# Patient Record
Sex: MALE | Race: WHITE | Employment: OTHER | URBAN - NONMETROPOLITAN AREA
[De-identification: names, ages, dates, MRNs, and addresses within clinical notes are randomized per-mention and may not be internally consistent; named-entity substitution may affect disease eponyms.]

---

## 2017-01-01 ENCOUNTER — ANESTHESIA (OUTPATIENT)
Dept: OPERATING ROOM | Age: 72
End: 2017-01-01
Payer: MEDICARE

## 2017-01-01 ENCOUNTER — OFFICE VISIT (OUTPATIENT)
Dept: VASCULAR SURGERY | Age: 72
End: 2017-01-01

## 2017-01-01 ENCOUNTER — APPOINTMENT (OUTPATIENT)
Dept: GENERAL RADIOLOGY | Age: 72
DRG: 189 | End: 2017-01-01
Payer: MEDICARE

## 2017-01-01 ENCOUNTER — HOSPITAL ENCOUNTER (EMERGENCY)
Age: 72
Discharge: HOSPICE | DRG: 189 | End: 2017-04-07
Attending: INTERNAL MEDICINE | Admitting: INTERNAL MEDICINE
Payer: MEDICARE

## 2017-01-01 ENCOUNTER — APPOINTMENT (OUTPATIENT)
Dept: CT IMAGING | Age: 72
DRG: 189 | End: 2017-01-01
Payer: MEDICARE

## 2017-01-01 ENCOUNTER — ANESTHESIA EVENT (OUTPATIENT)
Dept: OPERATING ROOM | Age: 72
End: 2017-01-01
Payer: MEDICARE

## 2017-01-01 ENCOUNTER — SURGERY (OUTPATIENT)
Age: 72
End: 2017-01-01

## 2017-01-01 ENCOUNTER — PREP FOR PROCEDURE (OUTPATIENT)
Dept: VASCULAR SURGERY | Age: 72
End: 2017-01-01

## 2017-01-01 VITALS
TEMPERATURE: 96.4 F | RESPIRATION RATE: 16 BRPM | WEIGHT: 120 LBS | BODY MASS INDEX: 18.83 KG/M2 | HEIGHT: 67 IN | HEART RATE: 51 BPM | DIASTOLIC BLOOD PRESSURE: 46 MMHG | OXYGEN SATURATION: 99 % | SYSTOLIC BLOOD PRESSURE: 101 MMHG

## 2017-01-01 VITALS
OXYGEN SATURATION: 100 % | SYSTOLIC BLOOD PRESSURE: 121 MMHG | TEMPERATURE: 97 F | RESPIRATION RATE: 8 BRPM | DIASTOLIC BLOOD PRESSURE: 45 MMHG

## 2017-01-01 VITALS — TEMPERATURE: 99.2 F | DIASTOLIC BLOOD PRESSURE: 83 MMHG | SYSTOLIC BLOOD PRESSURE: 130 MMHG | HEART RATE: 65 BPM

## 2017-01-01 VITALS — TEMPERATURE: 97.8 F | HEART RATE: 68 BPM

## 2017-01-01 VITALS — HEART RATE: 66 BPM | TEMPERATURE: 98.6 F

## 2017-01-01 DIAGNOSIS — Z09 FOLLOW UP: Primary | ICD-10-CM

## 2017-01-01 DIAGNOSIS — J96.02 ACUTE RESPIRATORY FAILURE WITH HYPERCAPNIA (HCC): Primary | ICD-10-CM

## 2017-01-01 DIAGNOSIS — N18.5 CKD (CHRONIC KIDNEY DISEASE), STAGE V (HCC): ICD-10-CM

## 2017-01-01 DIAGNOSIS — N18.6 ESRD (END STAGE RENAL DISEASE) (HCC): ICD-10-CM

## 2017-01-01 LAB
ALBUMIN SERPL-MCNC: 3.5 G/DL (ref 3.5–5.2)
ALP BLD-CCNC: 77 U/L (ref 40–130)
ALT SERPL-CCNC: 80 U/L (ref 5–41)
ANION GAP SERPL CALCULATED.3IONS-SCNC: 11 MMOL/L (ref 7–19)
APTT: >200 SEC (ref 26–36.2)
AST SERPL-CCNC: 65 U/L (ref 5–40)
BASE EXCESS ARTERIAL: 3.6 MMOL/L (ref -2–2)
BASOPHILS ABSOLUTE: 0 K/UL (ref 0–0.2)
BASOPHILS RELATIVE PERCENT: 0.1 % (ref 0–1)
BILIRUB SERPL-MCNC: 0.3 MG/DL (ref 0.2–1.2)
BUN BLDV-MCNC: 29 MG/DL (ref 8–23)
CALCIUM SERPL-MCNC: 9.1 MG/DL (ref 8.8–10.2)
CARBOXYHEMOGLOBIN ARTERIAL: 0.9 % (ref 0–5)
CHLORIDE BLD-SCNC: 91 MMOL/L (ref 98–111)
CO2: 31 MMOL/L (ref 22–29)
CREAT SERPL-MCNC: 3.4 MG/DL (ref 0.5–1.2)
EOSINOPHILS ABSOLUTE: 0 K/UL (ref 0–0.6)
EOSINOPHILS RELATIVE PERCENT: 0.5 % (ref 0–5)
GFR NON-AFRICAN AMERICAN: 18
GLOBULIN: 2.8 G/DL
GLUCOSE BLD-MCNC: 92 MG/DL (ref 74–109)
HCO3 ARTERIAL: 34.2 MMOL/L (ref 22–26)
HCT VFR BLD CALC: 30.9 % (ref 42–52)
HEMOGLOBIN, ART, EXTENDED: 9.7 G/DL (ref 14–18)
HEMOGLOBIN: 9.7 G/DL (ref 14–18)
INR BLD: 1.08 (ref 0.88–1.18)
LACTIC ACID: 0.6 MG/DL (ref 0.5–1.9)
LYMPHOCYTES ABSOLUTE: 0.6 K/UL (ref 1.1–4.5)
LYMPHOCYTES RELATIVE PERCENT: 6.9 % (ref 20–40)
MCH RBC QN AUTO: 31.2 PG (ref 27–31)
MCHC RBC AUTO-ENTMCNC: 31.4 G/DL (ref 33–37)
MCV RBC AUTO: 99.4 FL (ref 80–94)
METHEMOGLOBIN ARTERIAL: 0.5 %
MONOCYTES ABSOLUTE: 0.6 K/UL (ref 0–0.9)
MONOCYTES RELATIVE PERCENT: 6.8 % (ref 0–10)
NEUTROPHILS ABSOLUTE: 7 K/UL (ref 1.5–7.5)
NEUTROPHILS RELATIVE PERCENT: 84.8 % (ref 50–65)
O2 CONTENT ARTERIAL: 13.4 ML/DL
O2 SAT, ARTERIAL: 96.7 %
O2 THERAPY: ABNORMAL
PCO2 ARTERIAL: 96 MMHG (ref 35–45)
PDW BLD-RTO: 13.6 % (ref 11.5–14.5)
PH ARTERIAL: 7.16 (ref 7.35–7.45)
PLATELET # BLD: 122 K/UL (ref 130–400)
PMV BLD AUTO: 9.9 FL (ref 7.4–10.4)
PO2 ARTERIAL: 129 MMHG (ref 80–100)
POTASSIUM SERPL-SCNC: 4.3 MMOL/L (ref 3.5–5)
POTASSIUM, WHOLE BLOOD: 4.2
PRO-BNP: ABNORMAL PG/ML (ref 0–900)
PROTHROMBIN TIME: 14 SEC (ref 12–14.6)
RAPID INFLUENZA  B AGN: NEGATIVE
RAPID INFLUENZA A AGN: POSITIVE
RBC # BLD: 3.11 M/UL (ref 4.7–6.1)
SODIUM BLD-SCNC: 133 MMOL/L (ref 136–145)
TOTAL PROTEIN: 6.3 G/DL (ref 6.6–8.7)
WBC # BLD: 8.2 K/UL (ref 4.8–10.8)

## 2017-01-01 PROCEDURE — 85730 THROMBOPLASTIN TIME PARTIAL: CPT

## 2017-01-01 PROCEDURE — 87804 INFLUENZA ASSAY W/OPTIC: CPT

## 2017-01-01 PROCEDURE — 99024 POSTOP FOLLOW-UP VISIT: CPT | Performed by: PHYSICIAN ASSISTANT

## 2017-01-01 PROCEDURE — 2500000003 HC RX 250 WO HCPCS: Performed by: NURSE ANESTHETIST, CERTIFIED REGISTERED

## 2017-01-01 PROCEDURE — 94640 AIRWAY INHALATION TREATMENT: CPT

## 2017-01-01 PROCEDURE — 83605 ASSAY OF LACTIC ACID: CPT

## 2017-01-01 PROCEDURE — 36415 COLL VENOUS BLD VENIPUNCTURE: CPT

## 2017-01-01 PROCEDURE — 6360000002 HC RX W HCPCS: Performed by: PHYSICIAN ASSISTANT

## 2017-01-01 PROCEDURE — 36600 WITHDRAWAL OF ARTERIAL BLOOD: CPT

## 2017-01-01 PROCEDURE — 6360000002 HC RX W HCPCS: Performed by: NURSE ANESTHETIST, CERTIFIED REGISTERED

## 2017-01-01 PROCEDURE — 85610 PROTHROMBIN TIME: CPT

## 2017-01-01 PROCEDURE — 82803 BLOOD GASES ANY COMBINATION: CPT

## 2017-01-01 PROCEDURE — 2580000003 HC RX 258: Performed by: PHYSICIAN ASSISTANT

## 2017-01-01 PROCEDURE — 80053 COMPREHEN METABOLIC PANEL: CPT

## 2017-01-01 PROCEDURE — 71250 CT THORAX DX C-: CPT

## 2017-01-01 PROCEDURE — 71010 XR CHEST PORTABLE: CPT

## 2017-01-01 PROCEDURE — 99285 EMERGENCY DEPT VISIT HI MDM: CPT

## 2017-01-01 PROCEDURE — 85025 COMPLETE CBC W/AUTO DIFF WBC: CPT

## 2017-01-01 PROCEDURE — 99284 EMERGENCY DEPT VISIT MOD MDM: CPT | Performed by: PHYSICIAN ASSISTANT

## 2017-01-01 PROCEDURE — 99024 POSTOP FOLLOW-UP VISIT: CPT | Performed by: NURSE PRACTITIONER

## 2017-01-01 PROCEDURE — 87040 BLOOD CULTURE FOR BACTERIA: CPT

## 2017-01-01 PROCEDURE — 83880 ASSAY OF NATRIURETIC PEPTIDE: CPT

## 2017-01-01 PROCEDURE — 93005 ELECTROCARDIOGRAM TRACING: CPT

## 2017-01-01 PROCEDURE — 84132 ASSAY OF SERUM POTASSIUM: CPT

## 2017-01-01 PROCEDURE — 6370000000 HC RX 637 (ALT 250 FOR IP): Performed by: PHYSICIAN ASSISTANT

## 2017-01-01 PROCEDURE — 6360000002 HC RX W HCPCS: Performed by: ANESTHESIOLOGY

## 2017-01-01 RX ORDER — PROPOFOL 10 MG/ML
INJECTION, EMULSION INTRAVENOUS PRN
Status: DISCONTINUED | OUTPATIENT
Start: 2017-01-01 | End: 2017-01-01 | Stop reason: SDUPTHER

## 2017-01-01 RX ORDER — LIDOCAINE HYDROCHLORIDE 10 MG/ML
INJECTION, SOLUTION EPIDURAL; INFILTRATION; INTRACAUDAL; PERINEURAL PRN
Status: DISCONTINUED | OUTPATIENT
Start: 2017-01-01 | End: 2017-01-01 | Stop reason: SDUPTHER

## 2017-01-01 RX ORDER — MORPHINE SULFATE 4 MG/ML
4 INJECTION, SOLUTION INTRAMUSCULAR; INTRAVENOUS ONCE
Status: COMPLETED | OUTPATIENT
Start: 2017-01-01 | End: 2017-01-01

## 2017-01-01 RX ORDER — SODIUM CHLORIDE 0.9 % (FLUSH) 0.9 %
10 SYRINGE (ML) INJECTION PRN
Status: CANCELLED | OUTPATIENT
Start: 2017-01-01

## 2017-01-01 RX ORDER — SODIUM CHLORIDE 0.9 % (FLUSH) 0.9 %
10 SYRINGE (ML) INJECTION EVERY 12 HOURS SCHEDULED
Status: CANCELLED | OUTPATIENT
Start: 2017-01-01

## 2017-01-01 RX ORDER — EPHEDRINE SULFATE 50 MG/ML
INJECTION, SOLUTION INTRAVENOUS PRN
Status: DISCONTINUED | OUTPATIENT
Start: 2017-01-01 | End: 2017-01-01 | Stop reason: SDUPTHER

## 2017-01-01 RX ORDER — OXYMETAZOLINE HYDROCHLORIDE 0.05 G/100ML
2 SPRAY NASAL 2 TIMES DAILY
COMMUNITY

## 2017-01-01 RX ORDER — SUCCINYLCHOLINE CHLORIDE 20 MG/ML
INJECTION INTRAMUSCULAR; INTRAVENOUS PRN
Status: DISCONTINUED | OUTPATIENT
Start: 2017-01-01 | End: 2017-01-01 | Stop reason: SDUPTHER

## 2017-01-01 RX ORDER — ROCURONIUM BROMIDE 10 MG/ML
INJECTION, SOLUTION INTRAVENOUS PRN
Status: DISCONTINUED | OUTPATIENT
Start: 2017-01-01 | End: 2017-01-01 | Stop reason: SDUPTHER

## 2017-01-01 RX ORDER — VANCOMYCIN HYDROCHLORIDE 1 G/200ML
1000 INJECTION, SOLUTION INTRAVENOUS ONCE
Status: DISCONTINUED | OUTPATIENT
Start: 2017-01-01 | End: 2017-01-01

## 2017-01-01 RX ORDER — VANCOMYCIN HYDROCHLORIDE 1 G/200ML
1000 INJECTION, SOLUTION INTRAVENOUS
Status: CANCELLED | OUTPATIENT
Start: 2017-01-01 | End: 2017-01-01

## 2017-01-01 RX ORDER — 0.9 % SODIUM CHLORIDE 0.9 %
1000 INTRAVENOUS SOLUTION INTRAVENOUS ONCE
Status: COMPLETED | OUTPATIENT
Start: 2017-01-01 | End: 2017-01-01

## 2017-01-01 RX ORDER — MORPHINE SULFATE 4 MG/ML
4 INJECTION, SOLUTION INTRAMUSCULAR; INTRAVENOUS ONCE
Status: DISCONTINUED | OUTPATIENT
Start: 2017-01-01 | End: 2017-01-01

## 2017-01-01 RX ORDER — IPRATROPIUM BROMIDE AND ALBUTEROL SULFATE 2.5; .5 MG/3ML; MG/3ML
1 SOLUTION RESPIRATORY (INHALATION) ONCE
Status: COMPLETED | OUTPATIENT
Start: 2017-01-01 | End: 2017-01-01

## 2017-01-01 RX ORDER — ASPIRIN 81 MG/1
81 TABLET ORAL ONCE
Status: CANCELLED | OUTPATIENT
Start: 2017-01-01 | End: 2017-01-01

## 2017-01-01 RX ORDER — HYDROCODONE BITARTRATE AND ACETAMINOPHEN 7.5; 325 MG/1; MG/1
1 TABLET ORAL EVERY 6 HOURS PRN
COMMUNITY

## 2017-01-01 RX ORDER — LORAZEPAM 2 MG/ML
1 INJECTION INTRAMUSCULAR ONCE
Status: COMPLETED | OUTPATIENT
Start: 2017-01-01 | End: 2017-01-01

## 2017-01-01 RX ADMIN — MORPHINE SULFATE 4 MG: 4 INJECTION, SOLUTION INTRAMUSCULAR; INTRAVENOUS at 18:39

## 2017-01-01 RX ADMIN — ROCURONIUM BROMIDE 10 MG: 10 INJECTION INTRAVENOUS at 13:44

## 2017-01-01 RX ADMIN — BUPIVACAINE HYDROCHLORIDE AND EPINEPHRINE 50 ML: 2.5; 5 INJECTION, SOLUTION EPIDURAL; INFILTRATION; INTRACAUDAL; PERINEURAL at 14:00

## 2017-01-01 RX ADMIN — FENTANYL CITRATE 50 MCG: 50 INJECTION INTRAMUSCULAR; INTRAVENOUS at 13:44

## 2017-01-01 RX ADMIN — SUCCINYLCHOLINE CHLORIDE 100 MG: 20 INJECTION, SOLUTION INTRAMUSCULAR; INTRAVENOUS; PARENTERAL at 13:44

## 2017-01-01 RX ADMIN — LIDOCAINE HYDROCHLORIDE 5 ML: 10 INJECTION, SOLUTION EPIDURAL; INFILTRATION; INTRACAUDAL; PERINEURAL at 13:44

## 2017-01-01 RX ADMIN — TAZOBACTAM SODIUM AND PIPERACILLIN SODIUM 3.38 G: 375; 3 INJECTION, SOLUTION INTRAVENOUS at 18:40

## 2017-01-01 RX ADMIN — EPHEDRINE SULFATE 10 MG: 50 INJECTION, SOLUTION INTRAMUSCULAR; INTRAVENOUS; SUBCUTANEOUS at 13:57

## 2017-01-01 RX ADMIN — SODIUM CHLORIDE 1000 ML: 9 INJECTION, SOLUTION INTRAVENOUS at 18:40

## 2017-01-01 RX ADMIN — EPHEDRINE SULFATE 10 MG: 50 INJECTION, SOLUTION INTRAMUSCULAR; INTRAVENOUS; SUBCUTANEOUS at 14:00

## 2017-01-01 RX ADMIN — HEPARIN SODIUM: 1000 INJECTION, SOLUTION INTRAVENOUS; SUBCUTANEOUS at 13:39

## 2017-01-01 RX ADMIN — MORPHINE SULFATE 4 MG: 4 INJECTION, SOLUTION INTRAMUSCULAR; INTRAVENOUS at 18:57

## 2017-01-01 RX ADMIN — IPRATROPIUM BROMIDE AND ALBUTEROL SULFATE 1 AMPULE: .5; 3 SOLUTION RESPIRATORY (INHALATION) at 18:09

## 2017-01-01 RX ADMIN — PROPOFOL 125 MG: 10 INJECTION, EMULSION INTRAVENOUS at 13:44

## 2017-01-01 RX ADMIN — LORAZEPAM 1 MG: 2 INJECTION, SOLUTION INTRAMUSCULAR; INTRAVENOUS at 18:57

## 2017-01-01 ASSESSMENT — ENCOUNTER SYMPTOMS
CHEST TIGHTNESS: 0
SORE THROAT: 0
VOMITING: 0
NAUSEA: 0
BACK PAIN: 0
CONSTIPATION: 0
COLOR CHANGE: 0
STRIDOR: 0
ABDOMINAL DISTENTION: 0
COUGH: 1
SHORTNESS OF BREATH: 1
ABDOMINAL PAIN: 0
WHEEZING: 0
RHINORRHEA: 0
SHORTNESS OF BREATH: 1

## 2017-01-01 ASSESSMENT — PAIN SCALES - GENERAL
PAINLEVEL_OUTOF10: 4
PAINLEVEL_OUTOF10: 5

## 2017-01-16 PROBLEM — N18.5 CKD (CHRONIC KIDNEY DISEASE), STAGE V (HCC): Status: ACTIVE | Noted: 2017-01-01

## 2017-02-09 ENCOUNTER — APPOINTMENT (OUTPATIENT)
Dept: GENERAL RADIOLOGY | Facility: HOSPITAL | Age: 72
End: 2017-02-09

## 2017-02-09 ENCOUNTER — HOSPITAL ENCOUNTER (EMERGENCY)
Facility: HOSPITAL | Age: 72
Discharge: HOME | End: 2017-02-09
Attending: EMERGENCY MEDICINE | Admitting: EMERGENCY MEDICINE

## 2017-02-09 ENCOUNTER — APPOINTMENT (OUTPATIENT)
Dept: CT IMAGING | Facility: HOSPITAL | Age: 72
End: 2017-02-09

## 2017-02-09 VITALS
WEIGHT: 121 LBS | BODY MASS INDEX: 18.99 KG/M2 | RESPIRATION RATE: 18 BRPM | HEART RATE: 72 BPM | DIASTOLIC BLOOD PRESSURE: 84 MMHG | HEIGHT: 67 IN | OXYGEN SATURATION: 95 % | SYSTOLIC BLOOD PRESSURE: 136 MMHG | TEMPERATURE: 98.3 F

## 2017-02-09 DIAGNOSIS — S70.01XA CONTUSION OF RIGHT HIP, INITIAL ENCOUNTER: Primary | ICD-10-CM

## 2017-02-09 PROCEDURE — 73552 X-RAY EXAM OF FEMUR 2/>: CPT

## 2017-02-09 PROCEDURE — 99283 EMERGENCY DEPT VISIT LOW MDM: CPT

## 2017-02-09 PROCEDURE — 73502 X-RAY EXAM HIP UNI 2-3 VIEWS: CPT

## 2017-02-09 PROCEDURE — 72192 CT PELVIS W/O DYE: CPT

## 2017-02-09 RX ORDER — INDOMETHACIN 50 MG/1
50 CAPSULE ORAL
Qty: 30 CAPSULE | Refills: 0 | Status: SHIPPED | OUTPATIENT
Start: 2017-02-09 | End: 2017-03-22

## 2017-02-09 RX ORDER — INSULIN GLARGINE 100 [IU]/ML
INJECTION, SOLUTION SUBCUTANEOUS
COMMUNITY
End: 2017-03-22

## 2017-02-09 RX ORDER — BUDESONIDE AND FORMOTEROL FUMARATE DIHYDRATE 160; 4.5 UG/1; UG/1
AEROSOL RESPIRATORY (INHALATION)
COMMUNITY
End: 2017-03-22

## 2017-02-10 NOTE — ED PROVIDER NOTES
Subjective   HPI Comments: Lost balance and fell yesterday and now hurts in right hip whenever he tries to put weight on it.    Patient is a 72 y.o. male presenting with lower extremity pain.   History provided by:  Patient and spouse   used: No    Lower Extremity Issue   Location:  Hip  Time since incident:  1 day  Injury: yes    Mechanism of injury: fall    Hip location:  R hip  Pain details:     Quality:  Aching    Radiates to:  Does not radiate    Severity:  Moderate    Onset quality:  Sudden    Duration:  1 day    Timing:  Constant    Progression:  Unchanged  Chronicity:  New  Dislocation: no    Foreign body present:  No foreign bodies  Prior injury to area:  No  Relieved by:  Nothing  Worsened by:  Nothing  Ineffective treatments:  None tried  Associated symptoms: decreased ROM    Associated symptoms: no back pain and no fatigue    Risk factors: no concern for non-accidental trauma, no frequent fractures and no known bone disorder        Review of Systems   Constitutional: Negative.  Negative for fatigue.   HENT: Negative.    Respiratory: Negative.    Cardiovascular: Negative.    Genitourinary: Negative.    Musculoskeletal: Negative for back pain.   Neurological: Negative.    All other systems reviewed and are negative.      Past Medical History   Diagnosis Date   • CAD (coronary artery disease)    • Carotid atherosclerosis    • Diabetes mellitus    • Hyperlipidemia    • Hypertension    • Pacemaker      does not have  a pacemaker        Allergies   Allergen Reactions   • Contrast Dye      PT HAS KIDNEY DIEASE   • Promethazine Hcl Other (See Comments)     Kidney disease   • Hydrocodone-Acetaminophen Nausea Only     Makes him sick in higher doses       Past Surgical History   Procedure Laterality Date   • Cardiac catheterization     • Carotid stent       RCA   • Av fistula placement         Family History   Problem Relation Age of Onset   • Heart attack Father        Social History     Social  History   • Marital status:      Spouse name: N/A   • Number of children: N/A   • Years of education: N/A     Social History Main Topics   • Smoking status: Former Smoker   • Smokeless tobacco: None      Comment: quit 30 years ago    • Alcohol use 1.2 oz/week     2 Standard drinks or equivalent per week   • Drug use: No   • Sexual activity: Defer     Other Topics Concern   • None     Social History Narrative   • None       Prior to Admission medications    Medication Sig Start Date End Date Taking? Authorizing Provider   albuterol (PROVENTIL) (2.5 MG/3ML) 0.083% nebulizer solution Take 2.5 mg by nebulization.    Historical Provider, MD   allopurinol (ZYLOPRIM) 100 MG tablet Take 100 mg by mouth 2 (Two) Times a Day.    Historical Provider, MD   amLODIPine (NORVASC) 5 MG tablet Take 5 mg by mouth 2 (Two) Times a Day.    Historical Provider, MD   atorvastatin (LIPITOR) 80 MG tablet Take 80 mg by mouth Daily.    Historical Provider, MD   B Complex-C-Folic Acid (JABIER-ARIS PO) Take  by mouth.    Historical Provider, MD   budesonide-formoterol (SYMBICORT) 160-4.5 MCG/ACT inhaler Inhale 2 puffs into the lungs 2 times daily Indications: Prevention of COPD Worsening    Historical Provider, MD   clopidogrel (PLAVIX) 75 MG tablet Take 75 mg by mouth Daily.    Historical Provider, MD   HYDROcodone-acetaminophen (NORCO) 7.5-325 MG per tablet Take 1 tablet by mouth Every 8 (Eight) Hours As Needed for moderate pain (4-6).    Historical Provider, MD   indomethacin (INDOCIN) 50 MG capsule Take 1 capsule by mouth 3 (Three) Times a Day With Meals. 2/9/17   Zackary Dahl Jr., MD   insulin glargine (LANTUS) 100 UNIT/ML injection Inject 12 Units into the skin daily Indications: takes 12U in am; takes later on dialysis days once a day at hs    Historical Provider, MD   metoprolol tartrate (LOPRESSOR) 100 MG tablet Take 100 mg by mouth Daily.    Historical Provider, MD   nitroglycerin (NITROSTAT) 0.4 MG SL tablet Place 0.4 mg  under the tongue Every 5 (Five) Minutes As Needed for chest pain. Take no more than 3 doses in 15 minutes.    Historical Provider, MD   omeprazole (PriLOSEC) 20 MG capsule Take 20 mg by mouth Daily.    Historical Provider, MD   oxymetazoline (AFRIN) 0.05 % nasal spray into each nostril.    Historical Provider, MD       Medications - No data to display    Vitals:    02/09/17 1809   BP: 136/84   Pulse: 72   Resp: 18   Temp: 98.3 °F (36.8 °C)   SpO2: 95%         Objective   Physical Exam   Constitutional: He is oriented to person, place, and time. He appears well-developed and well-nourished.   HENT:   Head: Normocephalic.   Musculoskeletal:   Tender to palpation in right hip but no deformity noted.   Neurological: He is oriented to person, place, and time.   Nursing note and vitals reviewed.      Procedures         Lab Results (last 24 hours)     ** No results found for the last 24 hours. **          CT Pelvis Without Contrast   Final Result   1. No CT evidence of acute bony abnormality right hip.       These findings were described on a digital voice clip recorded on the   PACS system at the time of dictation.       This report was finalized on 02/09/2017 19:47 by Dr. George Perea MD.      XR Femur 2 View Right   Final Result   . No evidence of acute fracture.   This report was finalized on 02/09/2017 16:43 by Dr. Evangelist Nobles MD.      XR Hip With or Without Pelvis 2 - 3 View Right   Final Result   . No evidence of fracture.   This report was finalized on 02/09/2017 16:27 by Dr. Evangelist Nobles MD.          ED Course  ED Course          MDM  Number of Diagnoses or Management Options  Contusion of right hip, initial encounter: new and requires workup     Amount and/or Complexity of Data Reviewed  Tests in the radiology section of CPT®: ordered and reviewed    Risk of Complications, Morbidity, and/or Mortality  Presenting problems: moderate  Diagnostic procedures: moderate  Management options:  moderate    Patient Progress  Patient progress: stable      Final diagnoses:   Contusion of right hip, initial encounter        Zackary Dahl Jr., MD  02/09/17 9295

## 2017-03-22 ENCOUNTER — HOSPITAL ENCOUNTER (INPATIENT)
Facility: HOSPITAL | Age: 72
LOS: 6 days | Discharge: NURSING, REHAB OR LONG-TERM CARE | End: 2017-03-28
Attending: EMERGENCY MEDICINE | Admitting: INTERNAL MEDICINE

## 2017-03-22 ENCOUNTER — APPOINTMENT (OUTPATIENT)
Dept: GENERAL RADIOLOGY | Facility: HOSPITAL | Age: 72
End: 2017-03-22

## 2017-03-22 DIAGNOSIS — J44.1 CHRONIC OBSTRUCTIVE PULMONARY DISEASE WITH ACUTE EXACERBATION (HCC): Primary | ICD-10-CM

## 2017-03-22 DIAGNOSIS — Z74.09 IMPAIRED MOBILITY: ICD-10-CM

## 2017-03-22 LAB
ALBUMIN SERPL-MCNC: 3.9 G/DL (ref 3.5–5)
ALBUMIN/GLOB SERPL: 1.1 G/DL (ref 1.1–2.5)
ALP SERPL-CCNC: 101 U/L (ref 24–120)
ALT SERPL W P-5'-P-CCNC: 48 U/L (ref 0–54)
ANION GAP SERPL CALCULATED.3IONS-SCNC: 10 MMOL/L (ref 4–13)
ARTERIAL PATENCY WRIST A: ABNORMAL
AST SERPL-CCNC: 51 U/L (ref 7–45)
ATMOSPHERIC PRESS: ABNORMAL MMHG
BASE EXCESS BLDA CALC-SCNC: 5.4 MMOL/L (ref -2–2)
BASOPHILS # BLD AUTO: 0.01 10*3/MM3 (ref 0–0.2)
BASOPHILS NFR BLD AUTO: 0.1 % (ref 0–2)
BDY SITE: ABNORMAL
BILIRUB SERPL-MCNC: 0.6 MG/DL (ref 0.1–1)
BUN BLD-MCNC: 18 MG/DL (ref 5–21)
BUN/CREAT SERPL: 9.7 (ref 7–25)
CALCIUM SPEC-SCNC: 9.5 MG/DL (ref 8.4–10.4)
CHLORIDE SERPL-SCNC: 94 MMOL/L (ref 98–110)
CO2 SERPL-SCNC: 37 MMOL/L (ref 24–31)
CREAT BLD-MCNC: 1.85 MG/DL (ref 0.5–1.4)
DEPRECATED RDW RBC AUTO: 50.1 FL (ref 40–54)
EOSINOPHIL # BLD AUTO: 0.3 10*3/MM3 (ref 0–0.7)
EOSINOPHIL NFR BLD AUTO: 3.2 % (ref 0–4)
ERYTHROCYTE [DISTWIDTH] IN BLOOD BY AUTOMATED COUNT: 14.1 % (ref 12–15)
GAS FLOW AIRWAY: 2 LPM
GFR SERPL CREATININE-BSD FRML MDRD: 36 ML/MIN/1.73
GLOBULIN UR ELPH-MCNC: 3.5 GM/DL
GLUCOSE BLD-MCNC: 137 MG/DL (ref 70–100)
HCO3 BLDA-SCNC: 33.7 MMOL/L (ref 22–26)
HCT VFR BLD AUTO: 35.7 % (ref 40–52)
HGB BLD-MCNC: 11.5 G/DL (ref 14–18)
IMM GRANULOCYTES # BLD: 0.04 10*3/MM3 (ref 0–0.03)
IMM GRANULOCYTES NFR BLD: 0.4 % (ref 0–5)
LYMPHOCYTES # BLD AUTO: 0.78 10*3/MM3 (ref 0.72–4.86)
LYMPHOCYTES NFR BLD AUTO: 8.3 % (ref 15–45)
MCH RBC QN AUTO: 31.3 PG (ref 28–32)
MCHC RBC AUTO-ENTMCNC: 32.2 G/DL (ref 33–36)
MCV RBC AUTO: 97 FL (ref 82–95)
MODALITY: ABNORMAL
MONOCYTES # BLD AUTO: 0.77 10*3/MM3 (ref 0.19–1.3)
MONOCYTES NFR BLD AUTO: 8.2 % (ref 4–12)
NEUTROPHILS # BLD AUTO: 7.45 10*3/MM3 (ref 1.87–8.4)
NEUTROPHILS NFR BLD AUTO: 79.8 % (ref 39–78)
PCO2 BLDA: 65.9 MM HG (ref 35–45)
PH BLDA: 7.33 PH UNITS (ref 7.35–7.45)
PLATELET # BLD AUTO: 206 10*3/MM3 (ref 130–400)
PMV BLD AUTO: 9.9 FL (ref 6–12)
PO2 BLDA: 90 MM HG (ref 80–100)
POTASSIUM BLD-SCNC: 3.8 MMOL/L (ref 3.5–5.3)
PROT SERPL-MCNC: 7.4 G/DL (ref 6.3–8.7)
RBC # BLD AUTO: 3.68 10*6/MM3 (ref 4.8–5.9)
SAO2 % BLDCOA: 96 % (ref 94–100)
SAO2 % BLDCOA: 96 % (ref 94–100)
SODIUM BLD-SCNC: 141 MMOL/L (ref 135–145)
WBC NRBC COR # BLD: 9.35 10*3/MM3 (ref 4.8–10.8)

## 2017-03-22 PROCEDURE — 25010000002 LEVOFLOXACIN PER 250 MG: Performed by: INTERNAL MEDICINE

## 2017-03-22 PROCEDURE — 93010 ELECTROCARDIOGRAM REPORT: CPT | Performed by: INTERNAL MEDICINE

## 2017-03-22 PROCEDURE — 82962 GLUCOSE BLOOD TEST: CPT

## 2017-03-22 PROCEDURE — 71010 HC CHEST PA OR AP: CPT

## 2017-03-22 PROCEDURE — 36600 WITHDRAWAL OF ARTERIAL BLOOD: CPT

## 2017-03-22 PROCEDURE — 25010000002 METHYLPREDNISOLONE PER 125 MG: Performed by: EMERGENCY MEDICINE

## 2017-03-22 PROCEDURE — 63710000001 INSULIN DETEMIR PER 5 UNITS: Performed by: INTERNAL MEDICINE

## 2017-03-22 PROCEDURE — 87040 BLOOD CULTURE FOR BACTERIA: CPT | Performed by: EMERGENCY MEDICINE

## 2017-03-22 PROCEDURE — 94799 UNLISTED PULMONARY SVC/PX: CPT

## 2017-03-22 PROCEDURE — 99284 EMERGENCY DEPT VISIT MOD MDM: CPT

## 2017-03-22 PROCEDURE — 85025 COMPLETE CBC W/AUTO DIFF WBC: CPT | Performed by: EMERGENCY MEDICINE

## 2017-03-22 PROCEDURE — 94660 CPAP INITIATION&MGMT: CPT

## 2017-03-22 PROCEDURE — 93005 ELECTROCARDIOGRAM TRACING: CPT | Performed by: EMERGENCY MEDICINE

## 2017-03-22 PROCEDURE — 25010000002 HEPARIN (PORCINE) PER 1000 UNITS: Performed by: INTERNAL MEDICINE

## 2017-03-22 PROCEDURE — 82803 BLOOD GASES ANY COMBINATION: CPT

## 2017-03-22 PROCEDURE — 80053 COMPREHEN METABOLIC PANEL: CPT | Performed by: EMERGENCY MEDICINE

## 2017-03-22 PROCEDURE — 63710000001 INSULIN LISPRO (HUMAN) PER 5 UNITS: Performed by: INTERNAL MEDICINE

## 2017-03-22 RX ORDER — ALLOPURINOL 100 MG/1
100 TABLET ORAL EVERY 12 HOURS SCHEDULED
Status: DISCONTINUED | OUTPATIENT
Start: 2017-03-22 | End: 2017-03-22

## 2017-03-22 RX ORDER — LEVOFLOXACIN 5 MG/ML
500 INJECTION, SOLUTION INTRAVENOUS
Status: DISCONTINUED | OUTPATIENT
Start: 2017-03-22 | End: 2017-03-23

## 2017-03-22 RX ORDER — AMLODIPINE BESYLATE 5 MG/1
5 TABLET ORAL EVERY 12 HOURS SCHEDULED
Status: DISCONTINUED | OUTPATIENT
Start: 2017-03-22 | End: 2017-03-28 | Stop reason: HOSPADM

## 2017-03-22 RX ORDER — ATORVASTATIN CALCIUM 40 MG/1
80 TABLET, FILM COATED ORAL NIGHTLY
Status: DISCONTINUED | OUTPATIENT
Start: 2017-03-22 | End: 2017-03-28 | Stop reason: HOSPADM

## 2017-03-22 RX ORDER — PANTOPRAZOLE SODIUM 40 MG/1
40 TABLET, DELAYED RELEASE ORAL
Status: DISCONTINUED | OUTPATIENT
Start: 2017-03-23 | End: 2017-03-28 | Stop reason: HOSPADM

## 2017-03-22 RX ORDER — ALBUTEROL SULFATE 90 UG/1
1 AEROSOL, METERED RESPIRATORY (INHALATION) EVERY 4 HOURS PRN
COMMUNITY
End: 2017-03-28 | Stop reason: HOSPADM

## 2017-03-22 RX ORDER — METOPROLOL TARTRATE 50 MG/1
50 TABLET, FILM COATED ORAL EVERY 12 HOURS SCHEDULED
Status: DISCONTINUED | OUTPATIENT
Start: 2017-03-22 | End: 2017-03-28 | Stop reason: HOSPADM

## 2017-03-22 RX ORDER — RAMIPRIL 10 MG/1
10 CAPSULE ORAL 2 TIMES DAILY
COMMUNITY
End: 2017-03-28 | Stop reason: HOSPADM

## 2017-03-22 RX ORDER — ONDANSETRON 2 MG/ML
4 INJECTION INTRAMUSCULAR; INTRAVENOUS EVERY 6 HOURS PRN
Status: DISCONTINUED | OUTPATIENT
Start: 2017-03-22 | End: 2017-03-28 | Stop reason: HOSPADM

## 2017-03-22 RX ORDER — SODIUM CHLORIDE 0.9 % (FLUSH) 0.9 %
10 SYRINGE (ML) INJECTION AS NEEDED
Status: DISCONTINUED | OUTPATIENT
Start: 2017-03-22 | End: 2017-03-28 | Stop reason: HOSPADM

## 2017-03-22 RX ORDER — CLONIDINE HYDROCHLORIDE 0.1 MG/1
0.1 TABLET ORAL EVERY 6 HOURS PRN
Status: DISCONTINUED | OUTPATIENT
Start: 2017-03-22 | End: 2017-03-28 | Stop reason: HOSPADM

## 2017-03-22 RX ORDER — BUMETANIDE 1 MG/1
1 TABLET ORAL DAILY
COMMUNITY
End: 2017-03-22

## 2017-03-22 RX ORDER — CLOPIDOGREL BISULFATE 75 MG/1
75 TABLET ORAL DAILY
Status: DISCONTINUED | OUTPATIENT
Start: 2017-03-23 | End: 2017-03-28 | Stop reason: HOSPADM

## 2017-03-22 RX ORDER — HEPARIN SODIUM 5000 [USP'U]/ML
5000 INJECTION, SOLUTION INTRAVENOUS; SUBCUTANEOUS EVERY 12 HOURS SCHEDULED
Status: DISCONTINUED | OUTPATIENT
Start: 2017-03-22 | End: 2017-03-28 | Stop reason: HOSPADM

## 2017-03-22 RX ORDER — IPRATROPIUM BROMIDE AND ALBUTEROL SULFATE 2.5; .5 MG/3ML; MG/3ML
3 SOLUTION RESPIRATORY (INHALATION) EVERY 4 HOURS PRN
Status: DISCONTINUED | OUTPATIENT
Start: 2017-03-22 | End: 2017-03-28 | Stop reason: HOSPADM

## 2017-03-22 RX ORDER — METHYLPREDNISOLONE SODIUM SUCCINATE 125 MG/2ML
60 INJECTION, POWDER, LYOPHILIZED, FOR SOLUTION INTRAMUSCULAR; INTRAVENOUS EVERY 12 HOURS
Status: DISCONTINUED | OUTPATIENT
Start: 2017-03-23 | End: 2017-03-24

## 2017-03-22 RX ORDER — DEXTROSE MONOHYDRATE 25 G/50ML
25 INJECTION, SOLUTION INTRAVENOUS
Status: DISCONTINUED | OUTPATIENT
Start: 2017-03-22 | End: 2017-03-28 | Stop reason: HOSPADM

## 2017-03-22 RX ORDER — TAMSULOSIN HYDROCHLORIDE 0.4 MG/1
1 CAPSULE ORAL NIGHTLY
COMMUNITY
End: 2017-03-22

## 2017-03-22 RX ORDER — SODIUM CHLORIDE 0.9 % (FLUSH) 0.9 %
1-10 SYRINGE (ML) INJECTION AS NEEDED
Status: DISCONTINUED | OUTPATIENT
Start: 2017-03-22 | End: 2017-03-28 | Stop reason: HOSPADM

## 2017-03-22 RX ORDER — ACETAMINOPHEN 325 MG/1
650 TABLET ORAL EVERY 6 HOURS PRN
Status: DISCONTINUED | OUTPATIENT
Start: 2017-03-22 | End: 2017-03-28 | Stop reason: HOSPADM

## 2017-03-22 RX ORDER — METHYLPREDNISOLONE SODIUM SUCCINATE 125 MG/2ML
80 INJECTION, POWDER, LYOPHILIZED, FOR SOLUTION INTRAMUSCULAR; INTRAVENOUS EVERY 8 HOURS
Status: DISCONTINUED | OUTPATIENT
Start: 2017-03-23 | End: 2017-03-22

## 2017-03-22 RX ORDER — NICOTINE POLACRILEX 4 MG
15 LOZENGE BUCCAL
Status: DISCONTINUED | OUTPATIENT
Start: 2017-03-22 | End: 2017-03-28 | Stop reason: HOSPADM

## 2017-03-22 RX ORDER — METOPROLOL TARTRATE 100 MG/1
100 TABLET ORAL DAILY
Status: DISCONTINUED | OUTPATIENT
Start: 2017-03-23 | End: 2017-03-22

## 2017-03-22 RX ORDER — RAMIPRIL 5 MG/1
10 CAPSULE ORAL EVERY 12 HOURS SCHEDULED
Status: DISCONTINUED | OUTPATIENT
Start: 2017-03-22 | End: 2017-03-24

## 2017-03-22 RX ORDER — NITROGLYCERIN 0.4 MG/1
0.4 TABLET SUBLINGUAL
Status: DISCONTINUED | OUTPATIENT
Start: 2017-03-22 | End: 2017-03-28 | Stop reason: HOSPADM

## 2017-03-22 RX ORDER — METHYLPREDNISOLONE SODIUM SUCCINATE 125 MG/2ML
125 INJECTION, POWDER, LYOPHILIZED, FOR SOLUTION INTRAMUSCULAR; INTRAVENOUS ONCE
Status: COMPLETED | OUTPATIENT
Start: 2017-03-22 | End: 2017-03-22

## 2017-03-22 RX ADMIN — LEVOFLOXACIN 500 MG: 500 INJECTION, SOLUTION INTRAVENOUS at 22:08

## 2017-03-22 RX ADMIN — AMLODIPINE BESYLATE 5 MG: 5 TABLET ORAL at 22:06

## 2017-03-22 RX ADMIN — DESMOPRESSIN ACETATE 80 MG: 0.2 TABLET ORAL at 23:38

## 2017-03-22 RX ADMIN — METHYLPREDNISOLONE SODIUM SUCCINATE 125 MG: 125 INJECTION, POWDER, FOR SOLUTION INTRAMUSCULAR; INTRAVENOUS at 18:24

## 2017-03-22 RX ADMIN — INSULIN LISPRO 2 UNITS: 100 INJECTION, SOLUTION INTRAVENOUS; SUBCUTANEOUS at 22:38

## 2017-03-22 RX ADMIN — INSULIN DETEMIR 10 UNITS: 100 INJECTION, SOLUTION SUBCUTANEOUS at 22:52

## 2017-03-22 RX ADMIN — HEPARIN SODIUM 5000 UNITS: 5000 INJECTION, SOLUTION INTRAVENOUS; SUBCUTANEOUS at 22:43

## 2017-03-22 RX ADMIN — METOPROLOL TARTRATE 50 MG: 50 TABLET ORAL at 22:41

## 2017-03-22 NOTE — ED PROVIDER NOTES
Subjective   HPI Comments: Patient presents complaining of shortness of breath that is gone worse over the past 4 days.  He has a long history of lung disease but is much worse now.  He says any exertion makes her extremely short of breath.  He has coughing but there is no production with it and no fever.  He also complains of diffuse weakness.  He has a very poor appetite also.  He had dialysis today and was told by the staff there to come to the ER to get checked out and at least get a chest x-ray.  He saw his primary care physician a couple days ago but they could not hear anything bad that time.    Patient is a 72 y.o. male presenting with shortness of breath.   History provided by:  Patient and relative   used: No    Shortness of Breath   Severity:  Moderate  Onset quality:  Gradual  Duration:  4 days  Timing:  Constant  Progression:  Worsening  Chronicity:  Recurrent  Context: not activity, not animal exposure, not emotional upset, not fumes, not known allergens, not occupational exposure, not pollens, not smoke exposure, not strong odors, not URI and not weather changes    Relieved by:  Nothing  Worsened by:  Exertion  Ineffective treatments:  None tried  Associated symptoms: cough    Associated symptoms: no abdominal pain, no chest pain, no claudication, no diaphoresis, no ear pain, no fever, no headaches, no hemoptysis, no neck pain, no PND, no rash, no sore throat, no sputum production, no syncope, no swollen glands, no vomiting and no wheezing    Risk factors: no recent alcohol use, no family hx of DVT, no hx of cancer, no hx of PE/DVT, no obesity, no oral contraceptive use, no prolonged immobilization, no recent surgery and no tobacco use        Review of Systems   Constitutional: Negative for diaphoresis and fever.   HENT: Negative.  Negative for ear pain and sore throat.    Respiratory: Positive for cough and shortness of breath. Negative for hemoptysis, sputum production and  wheezing.    Cardiovascular: Negative.  Negative for chest pain, claudication, syncope and PND.   Gastrointestinal: Negative.  Negative for abdominal pain and vomiting.   Genitourinary: Negative.    Musculoskeletal: Negative.  Negative for neck pain.   Skin: Negative.  Negative for rash.   Neurological: Positive for weakness. Negative for headaches.   Psychiatric/Behavioral: Negative.    All other systems reviewed and are negative.      Past Medical History:   Diagnosis Date   • CAD (coronary artery disease)    • CAD in native artery    • Cardiac device in situ      in stent restenosis 2/10 RCA   • Carotid atherosclerosis    • Carotid bruit    • Diabetes mellitus     DM, UNCOMPLICATED, TYPE II, UNCONTROLLED   • History of PTCA    • Hyperlipidemia    • Hypertension    • Pacemaker     does not have  a pacemaker    • Peripheral vascular disease        Allergies   Allergen Reactions   • Contrast Dye      PT HAS KIDNEY DIEASE   • Promethazine Hcl Other (See Comments)     Kidney disease   • Hydrocodone-Acetaminophen Nausea Only     Makes him sick in higher doses       Past Surgical History:   Procedure Laterality Date   • ARTERIOVENOUS FISTULA     • CARDIAC CATHETERIZATION Left 02/18/2010    Stent to RCA   • CAROTID STENT      RCA       Family History   Problem Relation Age of Onset   • Heart attack Father        Social History     Social History   • Marital status:      Spouse name: N/A   • Number of children: N/A   • Years of education: N/A     Social History Main Topics   • Smoking status: Former Smoker   • Smokeless tobacco: None      Comment: quit 30 years ago    • Alcohol use 1.2 oz/week     2 Standard drinks or equivalent per week   • Drug use: No   • Sexual activity: Defer     Other Topics Concern   • None     Social History Narrative       Prior to Admission medications    Medication Sig Start Date End Date Taking? Authorizing Provider   albuterol (PROVENTIL) (2.5 MG/3ML) 0.083% nebulizer solution Take 2.5  mg by nebulization.    Historical Provider, MD   allopurinol (ZYLOPRIM) 100 MG tablet Take 100 mg by mouth 2 (Two) Times a Day.    Historical Provider, MD   amLODIPine (NORVASC) 5 MG tablet Take 5 mg by mouth 2 (Two) Times a Day.    Historical Provider, MD   atorvastatin (LIPITOR) 80 MG tablet Take 80 mg by mouth Daily.    Historical ProviderMD FISHER Complex-C-Folic Acid (JABIER-ARIS PO) Take  by mouth.    Historical Provider, MD   budesonide-formoterol (SYMBICORT) 160-4.5 MCG/ACT inhaler Inhale 2 puffs into the lungs 2 times daily Indications: Prevention of COPD Worsening    Historical Provider, MD   clopidogrel (PLAVIX) 75 MG tablet Take 75 mg by mouth Daily.    Historical Provider, MD   HYDROcodone-acetaminophen (NORCO) 7.5-325 MG per tablet Take 1 tablet by mouth Every 8 (Eight) Hours As Needed for moderate pain (4-6).    Historical Provider, MD   indomethacin (INDOCIN) 50 MG capsule Take 1 capsule by mouth 3 (Three) Times a Day With Meals. 2/9/17   Zackary Dahl Jr., MD   insulin glargine (LANTUS) 100 UNIT/ML injection Inject 12 Units into the skin daily Indications: takes 12U in am; takes later on dialysis days once a day at hs    Historical Provider, MD   metoprolol tartrate (LOPRESSOR) 100 MG tablet Take 100 mg by mouth Daily.    Historical Provider, MD   nitroglycerin (NITROSTAT) 0.4 MG SL tablet Place 0.4 mg under the tongue Every 5 (Five) Minutes As Needed for chest pain. Take no more than 3 doses in 15 minutes.    Historical Provider, MD   omeprazole (PriLOSEC) 20 MG capsule Take 20 mg by mouth Daily.    Historical Provider, MD   oxymetazoline (AFRIN) 0.05 % nasal spray into each nostril.    Historical Provider, MD       Medications   sodium chloride 0.9 % flush 10 mL (not administered)   amLODIPine (NORVASC) tablet 5 mg (5 mg Oral Given 3/22/17 2206)   atorvastatin (LIPITOR) tablet 80 mg (not administered)   clopidogrel (PLAVIX) tablet 75 mg (not administered)   insulin detemir (LEVEMIR) injection 10  Units (not administered)   nitroglycerin (NITROSTAT) SL tablet 0.4 mg (not administered)   ramipril (ALTACE) capsule 10 mg (10 mg Oral Not Given 3/22/17 2202)   sodium chloride 0.9 % flush 1-10 mL (not administered)   heparin (porcine) 5000 UNIT/ML injection 5,000 Units (not administered)   acetaminophen (TYLENOL) tablet 650 mg (not administered)   ondansetron (ZOFRAN) injection 4 mg (not administered)   ipratropium-albuterol (DUO-NEB) nebulizer solution 3 mL (not administered)   levoFLOXacin (LEVAQUIN) 500 mg/100 mL D5W (premix) (LEVAQUIN) 500 mg (not administered)   methylPREDNISolone sodium succinate (SOLU-Medrol) injection 60 mg (not administered)   metoprolol tartrate (LOPRESSOR) tablet 50 mg (not administered)   dextrose (GLUTOSE) oral gel 15 g (not administered)   dextrose (D50W) solution 25 g (not administered)   glucagon (human recombinant) (GLUCAGEN DIAGNOSTIC) injection 1 mg (not administered)   insulin lispro (humaLOG) injection 0-9 Units (not administered)   CloNIDine (CATAPRES) tablet 0.1 mg (not administered)   pantoprazole (PROTONIX) EC tablet 40 mg (not administered)   methylPREDNISolone sodium succinate (SOLU-Medrol) injection 125 mg (125 mg Intravenous Given 3/22/17 1824)       Vitals:    03/22/17 2039   BP: 169/63   Pulse: 75   Resp: 20   Temp: 98.3 °F (36.8 °C)   SpO2: 90%         Objective   Physical Exam   Constitutional: He is oriented to person, place, and time. He appears well-developed.   Patient appears thin and cachectic.   HENT:   Head: Normocephalic and atraumatic.   Nose: Nose normal.   Eyes: EOM are normal. Pupils are equal, round, and reactive to light.   Neck: Normal range of motion. Neck supple.   Cardiovascular: Normal rate and regular rhythm.    Pulmonary/Chest:   Patient has very distant breath sounds with poor air exchange but I do not hear any discreet wheezes or rales.  He does have a port in his right upper chest for dialysis.   Abdominal: Soft. Bowel sounds are normal.    Musculoskeletal: Normal range of motion.   Neurological: He is alert and oriented to person, place, and time.   Skin: Skin is warm and dry.   Psychiatric: He has a normal mood and affect. His behavior is normal.   Nursing note and vitals reviewed.      Procedures         Lab Results (last 24 hours)     Procedure Component Value Units Date/Time    CBC & Differential [[id number]] Collected:  03/22/17 1738    Specimen:  Blood Updated:  03/22/17 1801    Narrative:       The following orders were created for panel order CBC & Differential.  Procedure                               Abnormality         Status                     ---------                               -----------         ------                     CBC Auto Differential[[id number]]         Abnormal            Final result                 Please view results for these tests on the individual orders.    Comprehensive Metabolic Panel [[id number]]  (Abnormal) Collected:  03/22/17 1738    Specimen:  Blood Updated:  03/22/17 1811     Glucose 137 (H) mg/dL      BUN 18 mg/dL      Creatinine 1.85 (H) mg/dL      Sodium 141 mmol/L      Potassium 3.8 mmol/L      Chloride 94 (L) mmol/L      CO2 37.0 (H) mmol/L      Calcium 9.5 mg/dL      Total Protein 7.4 g/dL      Albumin 3.90 g/dL      ALT (SGPT) 48 U/L      AST (SGOT) 51 (H) U/L      Alkaline Phosphatase 101 U/L      Total Bilirubin 0.6 mg/dL      eGFR Non African Amer 36 (L) mL/min/1.73      Globulin 3.5 gm/dL      A/G Ratio 1.1 g/dL      BUN/Creatinine Ratio 9.7     Anion Gap 10.0 mmol/L     Narrative:       The MDRD GFR formula is only valid for adults with stable renal function between ages 18 and 70.    Blood Culture [[id number]] Collected:  03/22/17 1738    Specimen:  Blood from Arm, Right Updated:  03/22/17 1758    Blood Culture [[id number]] Collected:  03/22/17 1738    Specimen:  Blood from Arm, Right Updated:  03/22/17 1759    CBC Auto Differential [[id number]]  (Abnormal) Collected:  03/22/17 1738    Specimen:  Blood  Updated:  03/22/17 1801     WBC 9.35 10*3/mm3      RBC 3.68 (L) 10*6/mm3      Hemoglobin 11.5 (L) g/dL      Hematocrit 35.7 (L) %      MCV 97.0 (H) fL      MCH 31.3 pg      MCHC 32.2 (L) g/dL      RDW 14.1 %      RDW-SD 50.1 fl      MPV 9.9 fL      Platelets 206 10*3/mm3      Neutrophil % 79.8 (H) %      Lymphocyte % 8.3 (L) %      Monocyte % 8.2 %      Eosinophil % 3.2 %      Basophil % 0.1 %      Immature Grans % 0.4 %      Neutrophils, Absolute 7.45 10*3/mm3      Lymphocytes, Absolute 0.78 10*3/mm3      Monocytes, Absolute 0.77 10*3/mm3      Eosinophils, Absolute 0.30 10*3/mm3      Basophils, Absolute 0.01 10*3/mm3      Immature Grans, Absolute 0.04 (H) 10*3/mm3     Blood Gas, Arterial [[id number]]  (Abnormal) Collected:  03/22/17 1845    Specimen:  Arterial Blood Updated:  03/22/17 1848     Site Arterial: right brachial     Savage's Test --      Documented in Rapid Comm        pH, Arterial 7.327 (L) pH units      pCO2, Arterial 65.9 (H) mm Hg      pO2, Arterial 90.0 mm Hg      HCO3, Arterial 33.7 (H) mmol/L      Base Excess, Arterial 5.4 (H) mmol/L      O2 Saturation, Arterial 96.0 %      O2 Saturation Calculated 96.0 %      Barometric Pressure for Blood Gas -- mmHg       Component not reported at this site.        Modality Cannula     Flow Rate 2.00 lpm     Narrative:       Serial Number: [id number]    : [id number]          XR Chest 1 View   Final Result   Vascular congestion with cardiomegaly and bilateral pleural effusions.   This report was finalized on 03/22/2017 19:40 by Dr. Herson Coe MD.          ED Course  ED Course          MDM  Number of Diagnoses or Management Options  Chronic obstructive pulmonary disease with acute exacerbation: new and requires workup     Amount and/or Complexity of Data Reviewed  Clinical lab tests: ordered and reviewed  Tests in the radiology section of CPT®: ordered and reviewed  Tests in the medicine section of CPT®: ordered and reviewed  Decide to obtain previous medical  records or to obtain history from someone other than the patient: yes  Discuss the patient with other providers: yes    Risk of Complications, Morbidity, and/or Mortality  Presenting problems: moderate  Diagnostic procedures: moderate  Management options: moderate    Patient Progress  Patient progress: stable      Final diagnoses:   Chronic obstructive pulmonary disease with acute exacerbation        Zackary Dahl Jr., MD  03/22/17 5145

## 2017-03-23 ENCOUNTER — APPOINTMENT (OUTPATIENT)
Dept: CARDIOLOGY | Facility: HOSPITAL | Age: 72
End: 2017-03-23

## 2017-03-23 LAB
ANION GAP SERPL CALCULATED.3IONS-SCNC: 12 MMOL/L (ref 4–13)
BH CV ECHO MEAS - AO MAX PG (FULL): 8.5 MMHG
BH CV ECHO MEAS - AO MAX PG: 12.5 MMHG
BH CV ECHO MEAS - AO MEAN PG (FULL): 5 MMHG
BH CV ECHO MEAS - AO MEAN PG: 7 MMHG
BH CV ECHO MEAS - AO ROOT AREA (BSA CORRECTED): 1.9
BH CV ECHO MEAS - AO ROOT AREA: 7.1 CM^2
BH CV ECHO MEAS - AO ROOT DIAM: 3 CM
BH CV ECHO MEAS - AO V2 MAX: 177 CM/SEC
BH CV ECHO MEAS - AO V2 MEAN: 122 CM/SEC
BH CV ECHO MEAS - AO V2 VTI: 48.4 CM
BH CV ECHO MEAS - AVA(I,A): 2.3 CM^2
BH CV ECHO MEAS - AVA(I,D): 2.3 CM^2
BH CV ECHO MEAS - AVA(V,A): 2.2 CM^2
BH CV ECHO MEAS - AVA(V,D): 2.2 CM^2
BH CV ECHO MEAS - BSA(HAYCOCK): 1.5 M^2
BH CV ECHO MEAS - BSA: 1.6 M^2
BH CV ECHO MEAS - BZI_BMI: 17.7 KILOGRAMS/M^2
BH CV ECHO MEAS - BZI_METRIC_HEIGHT: 170.2 CM
BH CV ECHO MEAS - BZI_METRIC_WEIGHT: 51.3 KG
BH CV ECHO MEAS - EDV(CUBED): 92.3 ML
BH CV ECHO MEAS - EDV(TEICH): 93.4 ML
BH CV ECHO MEAS - EF(CUBED): 80.7 %
BH CV ECHO MEAS - EF(TEICH): 73.4 %
BH CV ECHO MEAS - ESV(CUBED): 17.8 ML
BH CV ECHO MEAS - ESV(TEICH): 24.8 ML
BH CV ECHO MEAS - FS: 42.3 %
BH CV ECHO MEAS - IVS/LVPW: 1.1
BH CV ECHO MEAS - IVSD: 1.2 CM
BH CV ECHO MEAS - LA DIMENSION: 4.2 CM
BH CV ECHO MEAS - LA/AO: 1.4
BH CV ECHO MEAS - LAT PEAK E' VEL: 7 CM/SEC
BH CV ECHO MEAS - LV MASS(C)D: 184.2 GRAMS
BH CV ECHO MEAS - LV MASS(C)DI: 116.1 GRAMS/M^2
BH CV ECHO MEAS - LV MAX PG: 4.1 MMHG
BH CV ECHO MEAS - LV MEAN PG: 2 MMHG
BH CV ECHO MEAS - LV V1 MAX: 101 CM/SEC
BH CV ECHO MEAS - LV V1 MEAN: 67.7 CM/SEC
BH CV ECHO MEAS - LV V1 VTI: 29.5 CM
BH CV ECHO MEAS - LVIDD: 4.5 CM
BH CV ECHO MEAS - LVIDS: 2.6 CM
BH CV ECHO MEAS - LVOT AREA (M): 3.8 CM^2
BH CV ECHO MEAS - LVOT AREA: 3.8 CM^2
BH CV ECHO MEAS - LVOT DIAM: 2.2 CM
BH CV ECHO MEAS - LVPWD: 1.1 CM
BH CV ECHO MEAS - MED PEAK E' VEL: 5.33 CM/SEC
BH CV ECHO MEAS - MR MAX PG: 146.4 MMHG
BH CV ECHO MEAS - MR MAX VEL: 605 CM/SEC
BH CV ECHO MEAS - MR MEAN PG: 97 MMHG
BH CV ECHO MEAS - MR MEAN VEL: 461 CM/SEC
BH CV ECHO MEAS - MR PISA RADIUS: 0.4 CM
BH CV ECHO MEAS - MR PISA: 1 CM^2
BH CV ECHO MEAS - MR VTI: 222 CM
BH CV ECHO MEAS - MV A MAX VEL: 64 CM/SEC
BH CV ECHO MEAS - MV DEC TIME: 0.17 SEC
BH CV ECHO MEAS - MV E MAX VEL: 126 CM/SEC
BH CV ECHO MEAS - MV E/A: 2
BH CV ECHO MEAS - RAP SYSTOLE: 10 MMHG
BH CV ECHO MEAS - RVSP: 72.7 MMHG
BH CV ECHO MEAS - SI(AO): 215.7 ML/M^2
BH CV ECHO MEAS - SI(CUBED): 47 ML/M^2
BH CV ECHO MEAS - SI(LVOT): 70.7 ML/M^2
BH CV ECHO MEAS - SI(TEICH): 43.2 ML/M^2
BH CV ECHO MEAS - SV(AO): 342.1 ML
BH CV ECHO MEAS - SV(CUBED): 74.6 ML
BH CV ECHO MEAS - SV(LVOT): 112.1 ML
BH CV ECHO MEAS - SV(TEICH): 68.6 ML
BH CV ECHO MEAS - TR MAX VEL: 396 CM/SEC
BUN BLD-MCNC: 33 MG/DL (ref 5–21)
BUN/CREAT SERPL: 12.5 (ref 7–25)
CALCIUM SPEC-SCNC: 9.1 MG/DL (ref 8.4–10.4)
CHLORIDE SERPL-SCNC: 94 MMOL/L (ref 98–110)
CO2 SERPL-SCNC: 34 MMOL/L (ref 24–31)
CREAT BLD-MCNC: 2.63 MG/DL (ref 0.5–1.4)
DEPRECATED RDW RBC AUTO: 51 FL (ref 40–54)
E/E' RATIO: 23.6
ERYTHROCYTE [DISTWIDTH] IN BLOOD BY AUTOMATED COUNT: 14.3 % (ref 12–15)
GFR SERPL CREATININE-BSD FRML MDRD: 24 ML/MIN/1.73
GLUCOSE BLD-MCNC: 184 MG/DL (ref 70–100)
GLUCOSE BLDC GLUCOMTR-MCNC: 138 MG/DL (ref 70–130)
GLUCOSE BLDC GLUCOMTR-MCNC: 153 MG/DL (ref 70–130)
GLUCOSE BLDC GLUCOMTR-MCNC: 156 MG/DL (ref 70–130)
GLUCOSE BLDC GLUCOMTR-MCNC: 209 MG/DL (ref 70–130)
GLUCOSE BLDC GLUCOMTR-MCNC: 251 MG/DL (ref 70–130)
HBA1C MFR BLD: 6.5 %
HCT VFR BLD AUTO: 31.8 % (ref 40–52)
HGB BLD-MCNC: 10.2 G/DL (ref 14–18)
LEFT ATRIUM VOLUME INDEX: 63.5 ML/M2
LEFT ATRIUM VOLUME: 101 CM3
MCH RBC QN AUTO: 31.1 PG (ref 28–32)
MCHC RBC AUTO-ENTMCNC: 32.1 G/DL (ref 33–36)
MCV RBC AUTO: 97 FL (ref 82–95)
PLATELET # BLD AUTO: 201 10*3/MM3 (ref 130–400)
PMV BLD AUTO: 9.8 FL (ref 6–12)
POTASSIUM BLD-SCNC: 4.2 MMOL/L (ref 3.5–5.3)
RBC # BLD AUTO: 3.28 10*6/MM3 (ref 4.8–5.9)
SODIUM BLD-SCNC: 140 MMOL/L (ref 135–145)
TSH SERPL DL<=0.05 MIU/L-ACNC: 0.74 MIU/ML (ref 0.47–4.68)
WBC NRBC COR # BLD: 5.37 10*3/MM3 (ref 4.8–10.8)

## 2017-03-23 PROCEDURE — 82962 GLUCOSE BLOOD TEST: CPT

## 2017-03-23 PROCEDURE — 85027 COMPLETE CBC AUTOMATED: CPT | Performed by: INTERNAL MEDICINE

## 2017-03-23 PROCEDURE — 63710000001 INSULIN DETEMIR PER 5 UNITS: Performed by: INTERNAL MEDICINE

## 2017-03-23 PROCEDURE — 94799 UNLISTED PULMONARY SVC/PX: CPT

## 2017-03-23 PROCEDURE — 84443 ASSAY THYROID STIM HORMONE: CPT | Performed by: INTERNAL MEDICINE

## 2017-03-23 PROCEDURE — 94660 CPAP INITIATION&MGMT: CPT

## 2017-03-23 PROCEDURE — 83036 HEMOGLOBIN GLYCOSYLATED A1C: CPT | Performed by: INTERNAL MEDICINE

## 2017-03-23 PROCEDURE — 93306 TTE W/DOPPLER COMPLETE: CPT | Performed by: INTERNAL MEDICINE

## 2017-03-23 PROCEDURE — 93306 TTE W/DOPPLER COMPLETE: CPT

## 2017-03-23 PROCEDURE — 97161 PT EVAL LOW COMPLEX 20 MIN: CPT

## 2017-03-23 PROCEDURE — 25010000002 METHYLPREDNISOLONE PER 125 MG: Performed by: INTERNAL MEDICINE

## 2017-03-23 PROCEDURE — 25010000002 HEPARIN (PORCINE) PER 1000 UNITS: Performed by: INTERNAL MEDICINE

## 2017-03-23 PROCEDURE — 80048 BASIC METABOLIC PNL TOTAL CA: CPT | Performed by: INTERNAL MEDICINE

## 2017-03-23 PROCEDURE — G8979 MOBILITY GOAL STATUS: HCPCS

## 2017-03-23 PROCEDURE — G8978 MOBILITY CURRENT STATUS: HCPCS

## 2017-03-23 RX ORDER — LEVOFLOXACIN 5 MG/ML
250 INJECTION, SOLUTION INTRAVENOUS
Status: DISCONTINUED | OUTPATIENT
Start: 2017-03-24 | End: 2017-03-24

## 2017-03-23 RX ADMIN — METOPROLOL TARTRATE 50 MG: 50 TABLET ORAL at 08:38

## 2017-03-23 RX ADMIN — INSULIN LISPRO 2 UNITS: 100 INJECTION, SOLUTION INTRAVENOUS; SUBCUTANEOUS at 08:35

## 2017-03-23 RX ADMIN — PANTOPRAZOLE SODIUM 40 MG: 40 TABLET, DELAYED RELEASE ORAL at 06:18

## 2017-03-23 RX ADMIN — AMLODIPINE BESYLATE 5 MG: 5 TABLET ORAL at 20:38

## 2017-03-23 RX ADMIN — DESMOPRESSIN ACETATE 80 MG: 0.2 TABLET ORAL at 20:40

## 2017-03-23 RX ADMIN — METHYLPREDNISOLONE SODIUM SUCCINATE 60 MG: 125 INJECTION, POWDER, FOR SOLUTION INTRAMUSCULAR; INTRAVENOUS at 17:23

## 2017-03-23 RX ADMIN — RAMIPRIL 10 MG: 5 CAPSULE ORAL at 08:37

## 2017-03-23 RX ADMIN — INSULIN LISPRO 4 UNITS: 100 INJECTION, SOLUTION INTRAVENOUS; SUBCUTANEOUS at 17:23

## 2017-03-23 RX ADMIN — INSULIN DETEMIR 10 UNITS: 100 INJECTION, SOLUTION SUBCUTANEOUS at 20:38

## 2017-03-23 RX ADMIN — METOPROLOL TARTRATE 50 MG: 50 TABLET ORAL at 20:38

## 2017-03-23 RX ADMIN — INSULIN LISPRO 6 UNITS: 100 INJECTION, SOLUTION INTRAVENOUS; SUBCUTANEOUS at 20:37

## 2017-03-23 RX ADMIN — HEPARIN SODIUM 5000 UNITS: 5000 INJECTION, SOLUTION INTRAVENOUS; SUBCUTANEOUS at 20:40

## 2017-03-23 RX ADMIN — HEPARIN SODIUM 5000 UNITS: 5000 INJECTION, SOLUTION INTRAVENOUS; SUBCUTANEOUS at 08:35

## 2017-03-23 RX ADMIN — AMLODIPINE BESYLATE 5 MG: 5 TABLET ORAL at 08:38

## 2017-03-23 RX ADMIN — METHYLPREDNISOLONE SODIUM SUCCINATE 60 MG: 125 INJECTION, POWDER, FOR SOLUTION INTRAMUSCULAR; INTRAVENOUS at 06:15

## 2017-03-23 RX ADMIN — CLOPIDOGREL BISULFATE 75 MG: 75 TABLET, FILM COATED ORAL at 08:38

## 2017-03-23 NOTE — H&P
TIME: 9:31 p.m.    PRIMARY CARE PHYSICIAN: Rafita Harris MD    HISTORY OF PRESENT ILLNESS: Mr. Roberts is a 72-year-old  male who presents to [facility] due to a multiplicity of complaints including fatigue, malaise, generalized weakness, poor appetite, weight loss, and shortness of breath. His symptoms have been present and worsening for at least 1 week. Mr. Roberts undergoes hemodialysis on Monday, Wednesdays and Fridays. Today, after dialysis, he was much weaker than usual. He was directed to the emergency department. Laboratory and imaging studies are consistent with acute respiratory insufficiency, likely associated with congestive heart failure. Chest x-ray demonstrates vascular congestion with cardiomegaly and bilateral pleural effusions. Mr. Roberts will require admission to the hospital for further evaluation and treatment. Presently, he is resting comfortably and is in no distress. However, he is profoundly weak. His wife is present at the bedside.     REVIEW OF SYSTEMS: Essentially unremarkable from a cardiovascular, pulmonary, gastrointestinal, genitourinary, neurologic, psychiatric, metabolic and constitutional standpoint except as noted. The patient relates chronic and worsening fatigue, malaise, lethargy, and weakness. He has had no recent fevers, chills, or sweats. He relates a poor appetite with weight loss. He relates having intermittent chest pain, although this does not appear to be a major complaint. He has had no chest palpitations. He relates worsening shortness of breath. He has had no lower extremity edema, orthopnea, cough, wheezing, or hemoptysis. He has had no abdominal pain, nausea, vomiting, diarrhea. He relates recurring constipation. He has had no dyspepsia, dysphagia, odynophagia, hematemesis, hematochezia, or melena. He has had no pelvic pain, flank pain, hematuria, or dysuria. He has had no skin rashes, arthralgias, myalgias, or swollen joints. He has  had no headache, confusion, memory deficits or loss of consciousness. He has had no recent changes in his vision or hearing. He has had no acute motor or sensory deficits. He has had no tremor. He relates having an unsteady gait and has suffered several falls recently.     PAST MEDICAL HISTORY:  1.  Hypertension.   2.  Dyslipidemia.   3.  Diabetes mellitus type 2.   4.  Coronary artery disease.   5.  Chronic congestive heart failure of uncertain type.   6.  COPD.   7.  Severe pulmonary hypertension.   8.  Obstructive sleep apnea necessitating CPAP therapy.   9.  Chronic hypoxic respiratory failure necessitating home oxygen use.   10.  End-stage renal disease necessitating hemodialysis.   11.  Gastroesophageal reflux disease.   12.  Hiatal hernia.   13.  Sylvester's esophagus.   14.  Gout.   15.  Raynaud’s phenomenon.   16.  Anemia of chronic disease, likely related to chronic kidney disease.   17.  Hearing impairment.   18.  Renal artery stenosis.   19.  Bilateral carotid stenosis.   20.  Hearing impairment.     PAST SURGICAL HISTORY:  1.  Status post cardiac stent deployment.   2.  Status post renal artery stent deployment.   3.  Status post sinus surgery of uncertain type.   4.  Status post bilateral carotid endarterectomy.   5.  Status post left inguinal hernia repair.     HOME MEDICATIONS:  1.  Albuterol 1-4 inhalations q.4 h. p.r.n. for shortness of breath and/or wheezing.   2.  Albuterol nebulizer 1 unit q.6 h. p.r.n. for shortness of breath and/or wheezing.   3.  Allopurinol 100 mg p.o. b.i.d.   4.  Norvasc 5 mg p.o. b.i.d.   5.  Lipitor 80 mg p.o. at bedtime.   6.  Debra-Rose 1 p.o. daily.   7.  Plavix 75 mg p.o. daily.   8.  Auryxia 1 p.o. daily.   9.  Lantus insulin 10 units subcutaneous at bedtime.   10.  Metoprolol 100 mg p.o. daily.   11.  Nitroglycerin sublingual tablets 0.4 mg p.o. p.r.n. for chest pain.   12.  Prilosec 20 mg p.o. b.i.d.   13.  Altace 10 mg p.o. b.i.d.     ALLERGIES:  1.  CONTRAST  DYE.  2.  PROMETHAZINE.  3.  HYDROCODONE.    SOCIAL HISTORY: Significant for being a resident of [place], [place]. He is . He has 2 daughters in good health, although 1 has a history of diabetes. He is a retired . He has an 11th grade education. He smoked a variable amount of tobacco for approximately 25 years, but quit smoking approximately 34 years ago. He drinks [employer] beer on rare occasions, but not regularly. He has no history of illicit drug use. He describes his Sabianism affiliation as Orthodox. He has no recent history of travel outside this region.     He is a FULL CODE.     He designates his wife Da to serve as a surrogate for healthcare matters should such become necessary.     FAMILY HISTORY: Significant for having a brother in apparent good health. His father is  due to heart attack. His mother is  due to lymphoma of uncertain type.     PHYSICAL EXAMINATION:    VITAL SIGNS: Temperature is 98.3, pulse 75, respirations are 20 and unlabored, blood pressure is 169/63 and O2 saturations are 90% breathing supplemental oxygen, weight is 113 pounds.     GENERAL: This is a 72-year-old  male appearing his documented age. He is resting comfortably in bed. He is in no apparent distress. He is requesting a diet. He appears chronically ill. He is hearing impaired. He proves to be a poor historian. His wife is present at the bedside.     HEAD AND NECK: Essentially unremarkable except as noted. He has contusion on his left cheek. Eyes, nose, and throat are essentially unremarkable. Sclerae are clear. There is no discharge in the nostrils. Mucous membranes are moist. Neck is supple. He has no cervical or clavicular adenopathy. He has bilateral carotid bruits. There are no masses of the head or neck. Neck veins do not appear pathologically distended.     CARDIAC: Reveals S1 and S2 with a regular rhythm. He has no definite murmurs, rubs, or gallops.     LUNGS: Reveals  bilateral breath sounds that are clear to auscultation throughout. He has no rales, wheezes, or rhonchi.     ABDOMEN: Reveals bowel sounds to be present. His abdomen is nontender, nondistended and soft.     EXTREMITIES: No lower extremity edema, erythema or calf tenderness.     NEUROLOGIC: Reveals the patient to be awake and alert. He seems oriented to person, place, time and situation. Cranial nerves II-XII appear grossly intact. He exhibits no definite focal, motor or sensory deficits. He is profoundly weak. His gait was not tested.     PSYCHIATRIC: Reveals his mood to be stable. Affect seems appropriate. Thought processes are organized in that he is able to answer questions and provide somewhat of a history. There is no flight of ideas. Speech is fluent. There are no obvious short-term or long-term memory deficits. Note that psychiatric examination is suboptimal due to the patient's hearing impairment. He also has a speech impediment.     DIAGNOSTIC DATA: Sodium 141, potassium 3.8, chloride 94, CO2 of 37, BUN 18, creatinine 1.85, glucose 137, total calcium 9.5.     Liver function testing is essentially unremarkable except for an AST of 51.     CBC demonstrates a white blood cell count of 9.35, hemoglobin 11.5, hematocrit 35.7, platelet count of 206,000.     Arterial blood gas demonstrates a pH of 7.327, pCO2 of 65.9, pO2 of 90 and O2 saturation is 96%.     Chest x-ray demonstrates vascular congestion with cardiomegaly and bilateral pleural effusions.     EKG demonstrates sinus rhythm of 77 beats per minute. There is evidence of LVH.     IMPRESSION:  1.  Acute dyspnea secondary to COPD exacerbation.   2.  Hypertension.   3.  Dyslipidemia.   4.  Diabetes mellitus type 2.   5.  Coronary artery disease.   6.  Chronic congestive heart failure of uncertain type.   7.  COPD.   8.  Severe pulmonary hypertension.   9.  End-stage renal disease necessitating hemodialysis.   10.  Obstructive sleep apnea.   11.  Anemia due to  chronic disease likely related to chronic kidney disease.     PLAN: At this time, Mr. Roberts will be admitted to [facility] for further evaluation and treatment. His multiple admitting diagnoses are as noted. His condition at this time is guarded. His prognosis is poor.     I have asked the nursing staff to obtain vital signs per protocol. He will be confined to bedrest. His allergies to CONTRAST DYE, PROMETHAZINE and HYDROCODONE are noted. I have asked the nursing staff to monitor input and output. Daily weights will be obtained. He will be maintained on a renal diet. IV fluids will be saline locked. Oxygen will be used as needed to maintain his O2 saturations greater than 92%. He is a FULL CODE. Fall precautions are to be initiated.     I will consult the Nephrology service.     INITIAL ADMITTING MEDICATIONS:  1.  Allopurinol 100 mg p.o. b.i.d.   2.  Norvasc 5 mg p.o. q.12 h.   3.  Lipitor 80 mg p.o. daily.   4.  Plavix 75 mg p.o. daily.   5.  Heparin 5000 units subcutaneous q.12 h.   6.  Levemir 10 units subcutaneous at bedtime.   7.  Levaquin 500 mg IV q.48 h.   8.  Solu-Medrol 60 mg IV q.12 h.   9.  Metoprolol 50 mg p.o. q.12 h.   10.  Altace 10 mg p.o. b.i.d.   11.  Tylenol 650 mg p.o. q.6 h. p.r.n. for fever and/or discomfort.   12.  DuoNeb 1 unit q.4 h. p.r.n. for shortness of breath.   13.  Nitroglycerin sublingual tablets 0.4 mg p.o. p.r.n. for chest pain.   14.  Zofran 4 mg IV q.6 h. p.r.n. for nausea and vomiting.     I will institute BiPAP therapy.     I will obtain followup laboratory studies in the morning.     I will continue to follow Mr. Roberts closely through the night pending return of the hospitalist team in the morning. The nursing staff may call should they have any questions or concerns. Please refer to the medical record for additional information, orders and/or comments.       cc:  MANDY SANCHEZ M.D.     KOBE Welch/[id number]  D:  03/22/2017 22:48:49(Eastern  Time)  T:  03/22/2017 23:27:54(Eastern Time)  Voice ID:  [id number]/Document ID:  [id number]

## 2017-03-23 NOTE — PROGRESS NOTES
[facility] Medicine Services  INPATIENT PROGRESS NOTE    Length of Stay: 1  Date of Admission: 3/22/2017  Primary Care Physician: Rafita Harris MD    Subjective   Chief Complaint: Follow up     HPI   The patient is currently sitting up in bed eating breakfast. He states that he is feeling a little better but still not good. He is short of breath with eating. His wife is at his bedside. She states that he has progressively gone downhill lately. No complaints currently.     Review of Systems   Constitutional: Positive for activity change, appetite change and fatigue. Negative for chills and fever.   HENT: Negative for hearing loss, nosebleeds, tinnitus and trouble swallowing.    Eyes: Negative for visual disturbance.   Respiratory: Positive for shortness of breath. Negative for cough, chest tightness and wheezing.    Cardiovascular: Negative for chest pain, palpitations and leg swelling.   Gastrointestinal: Negative for abdominal distention, abdominal pain, blood in stool, constipation, diarrhea, nausea and vomiting.   Endocrine: Negative for cold intolerance, heat intolerance, polydipsia, polyphagia and polyuria.   Genitourinary: Negative for decreased urine volume, difficulty urinating, dysuria, flank pain, frequency and hematuria.   Musculoskeletal: Positive for myalgias. Negative for arthralgias and joint swelling.   Skin: Negative for rash.   Allergic/Immunologic: Negative for immunocompromised state.   Neurological: Positive for weakness. Negative for dizziness, syncope, light-headedness and headaches.   Hematological: Negative for adenopathy. Does not bruise/bleed easily.   Psychiatric/Behavioral: Negative for confusion and sleep disturbance. The patient is not nervous/anxious.       All pertinent negatives and positives are as above. All other systems have been reviewed and are negative unless otherwise stated.     Objective    Temp:  [97.9 °F (36.6 °C)-98.4 °F (36.9  °C)] 98.1 °F (36.7 °C)  Heart Rate:  [64-79] 70  Resp:  [18-23] 18  BP: (116-199)/(51-73) 168/59     Physical Exam   Constitutional: He is oriented to person, place, and time. He appears cachectic. He appears ill.   HENT:   Head: Normocephalic and atraumatic.   Eyes: Conjunctivae and EOM are normal. Pupils are equal, round, and reactive to light.   Neck: Neck supple. No JVD present. No thyromegaly present.   Cardiovascular: Normal rate, regular rhythm, normal heart sounds and intact distal pulses.  Exam reveals no gallop and no friction rub.    No murmur heard.  Pulmonary/Chest: Effort normal. No respiratory distress. He has wheezes. He has rales. He exhibits no tenderness.   Abdominal: Soft. Bowel sounds are normal. He exhibits no distension. There is no tenderness. There is no rebound and no guarding.   Musculoskeletal: Normal range of motion. He exhibits no edema, tenderness or deformity.   Lymphadenopathy:     He has no cervical adenopathy.   Neurological: He is alert and oriented to person, place, and time. He displays normal reflexes. No cranial nerve deficit. He exhibits normal muscle tone.   Skin: Skin is warm and dry. No rash noted.   Psychiatric: He has a normal mood and affect. His behavior is normal. Judgment and thought content normal.   Vitals reviewed.    Results Review:  I have reviewed the labs, radiology results, and diagnostic studies.    Laboratory Data:     Results from last 7 days  Lab Units 03/23/17  0459 03/22/17  1738   WBC 10*3/mm3 5.37 9.35   HEMOGLOBIN g/dL 10.2* 11.5*   HEMATOCRIT % 31.8* 35.7*   PLATELETS 10*3/mm3 201 206       Results from last 7 days  Lab Units 03/23/17  0459 03/22/17  1738   SODIUM mmol/L 140 141   POTASSIUM mmol/L 4.2 3.8   CHLORIDE mmol/L 94* 94*   TOTAL CO2 mmol/L 34.0* 37.0*   BUN mg/dL 33* 18   CREATININE mg/dL 2.63* 1.85*   CALCIUM mg/dL 9.1 9.5   BILIRUBIN mg/dL  --  0.6   ALK PHOS U/L  --  101   ALT (SGPT) U/L  --  48   AST (SGOT) U/L  --  51*   GLUCOSE mg/dL  184* 137*     Culture Data:   Blood Culture   Date Value Ref Range Status   03/22/2017 No growth at less than 24 hours  Preliminary   03/22/2017 No growth at less than 24 hours  Preliminary     Radiology Data:   Imaging Results (last 24 hours)     Procedure Component Value Units Date/Time    XR Chest 1 View [[id number]] Collected:  03/22/17 1807     Updated:  03/1943    Narrative:       EXAMINATION:  XR CHEST 1 VW- 3/22/2017 5:21 PM CDT     HISTORY: Short of breath.     A single view of the chest is obtained. A right pleural effusion is  present. A small left pleural effusion is present.     A right perihilar interstitial infiltrate is present. This has the  appearance of pulmonary vascular congestion. A right internal jugular  central venous catheter is present, unchanged from 12/03/2016.       Impression:       Vascular congestion with cardiomegaly and bilateral pleural effusions.  This report was finalized on 03/22/2017 19:40 by Dr. Herson Coe MD.        I have reviewed the patient current medications.     Assessment/Plan     Hospital Problem List     Chronic obstructive pulmonary disease with acute exacerbation      Assessment:  1. Acute dyspnea secondary to COPD exacerbation.   2. Hypertension.   3. Dyslipidemia.   4. Diabetes mellitus type 2.   5. Coronary artery disease.   6. Chronic congestive heart failure of uncertain type.   7. COPD.   8. Severe pulmonary hypertension.   9. End-stage renal disease necessitating hemodialysis.   10. Obstructive sleep apnea.   11. Anemia due to chronic disease likely related to chronic kidney disease.   12. Bilateral pleural effusions     Plan:  1. Consult Nephrology for HD management  2. Labs in AM  3. Continue IV steroids  4. Continue Levaquin  5. Duonebs, mucinex continue  6. Consult PT  7. Bipap PRN  8. 2D echo  Discussed above plan with NP. Agree. He likely would benefit from rehab at d/c.  Will ask social work to look into options.  Dialysis for effusions.   Katt Livingston MD  Discharge Planning: I expect patient to be discharged to home/rehab in 2-4 days.    CHARO Zayas   03/23/17   8:56 AM

## 2017-03-23 NOTE — PROGRESS NOTES
Acute Care - Physical Therapy Initial Evaluation  [facility]     Patient Name: Nicolas Roberts  : 1945  MRN: [id number]  Today's Date: 3/23/2017   Onset of Illness/Injury or Date of Surgery Date: 17  Date of Referral to PT: 17  Referring Physician: Bernardo MANCILLA      Admit Date: 3/22/2017     Visit Dx:    ICD-10-CM ICD-9-CM   1. Chronic obstructive pulmonary disease with acute exacerbation J44.1 491.21     Patient Active Problem List   Diagnosis   • CAD (coronary artery disease)   • Carotid atherosclerosis   • Hypertension   • Chronic diastolic congestive heart failure   • Simple chronic bronchitis   • Chronic obstructive pulmonary disease with acute exacerbation     Past Medical History:   Diagnosis Date   • CAD (coronary artery disease)    • CAD in native artery    • Cardiac device in situ      in stent restenosis 2/10 RCA   • Carotid atherosclerosis    • Carotid bruit    • Diabetes mellitus     DM, UNCOMPLICATED, TYPE II, UNCONTROLLED   • History of PTCA    • Hyperlipidemia    • Hypertension    • Pacemaker     does not have  a pacemaker    • Peripheral vascular disease      Past Surgical History:   Procedure Laterality Date   • ARTERIOVENOUS FISTULA     • CARDIAC CATHETERIZATION Left 2010    Stent to RCA   • CAROTID STENT      RCA          PT ASSESSMENT (last 72 hours)      PT Evaluation       17 1434 17 1017    Rehab Evaluation    Document Type evaluation   see MAR  -PB     Subjective Information agree to therapy;complains of;weakness;fatigue;dyspnea  -PB     Patient Effort, Rehab Treatment good  -PB     General Information    Patient Profile Review yes  -PB     Onset of Illness/Injury or Date of Surgery Date 17  -PB     Referring Physician Bernardo MANCILLA  -PB     General Observations awake and alert in bed on 2L O2/NC  -PB     Pertinent History Of Current Problem fatigue, malaise, general weakness, poor appetite, weight loss, SOB, cough x1 week; COPD exacerbation   -PB     Precautions/Limitations fall precautions;oxygen therapy device and L/min  -PB     Prior Level of Function independent:;all household mobility;ADL's  -PB     Equipment Currently Used at Home oxygen;walker, rolling;commode;grab bar;wheelchair;shower chair   using rollator PTA; not using BSC  -PB (P)  grab bar;commode  -PH    Plans/Goals Discussed With patient and family;agreed upon  -PB     Risks Reviewed patient and family:;nausea/vomiting;LOB;dizziness;increased discomfort  -PB     Benefits Reviewed patient and family:;improve function;increase independence;increase strength;increase balance  -PB     Barriers to Rehab previous functional deficit;medically complex;physical barrier;hearing deficit  -PB     Living Environment    Lives With spouse  -PB     Living Arrangements house  -PB     Home Accessibility grab bars present (bathtub);stairs to enter home;ramps present at home   walk in shower  -PB     Number of Stairs to Enter Home 1   ramp onto deck then 4inch step to inside  -PB     Clinical Impression    Date of Referral to PT 03/23/17  -PB     PT Diagnosis impaired gait  -PB     Patient/Family Goals Statement return home  -PB     Criteria for Skilled Therapeutic Interventions Met yes;treatment indicated  -PB     Pathology/Pathophysiology Noted (Describe Specifically for Each System) musculoskeletal  -PB     Impairments Found (describe specific impairments) aerobic capacity/endurance;gait, locomotion, and balance  -PB     Functional Limitations in Following Categories (Describe Specific Limitations) self-care  -PB     Disability: Inability to Perform Actions/Activities of Required Roles (describe specific disability) community/leisure  -PB     Rehab Potential good, to achieve stated therapy goals  -PB     Predicted Duration of Therapy Intervention (days/wks) until D/C  -PB     Vital Signs    Pre SpO2 (%) 100  -PB     O2 Delivery Pre Treatment supplemental O2   2 LO2/NC  -PB     Intra SpO2 (%) 90  -PB      O2 Delivery Intra Treatment supplemental O2  -PB     Post SpO2 (%) 100  -PB     O2 Delivery Post Treatment supplemental O2  -PB     Pre Patient Position Supine  -PB     Intra Patient Position Standing  -PB     Post Patient Position Sitting  -PB     Pain Assessment    Pain Assessment No/denies pain  -PB     Cognitive Assessment/Intervention    Current Cognitive/Communication Assessment functional  -PB     Orientation Status oriented x 4  -PB     Follows Commands/Answers Questions 100% of the time;able to follow multi-step instructions  -PB     Personal Safety WNL/WFL  -PB     Personal Safety Interventions fall prevention program maintained;gait belt;nonskid shoes/slippers when out of bed;supervised activity  -PB     ROM (Range of Motion)    General ROM no range of motion deficits identified  -PB     MMT (Manual Muscle Testing)    General MMT Assessment Detail functionally 4+/5 all 4 extremities  -PB     Bed Mobility, Assessment/Treatment    Bed Mobility, Assistive Device head of bed elevated  -PB     Bed Mob, Supine to Sit, [place] supervision required  -PB     Bed Mob, Sit to Supine, [place] not tested   up in chair  -PB     Transfer Assessment/Treatment    Transfers, Sit-Stand [place] contact guard assist  -PB     Transfers, Stand-Sit [place] contact guard assist  -PB     Transfers, Sit-Stand-Sit, Assist Device rolling walker  -PB     Transfer, Safety Issues step length decreased  -PB     Transfer, Impairments impaired balance  -PB     Gait Assessment/Treatment    Gait, [place] Level contact guard assist  -PB     Gait, Assistive Device rolling walker  -PB     Gait, Distance (Feet) 250  -PB     Gait, Gait Pattern Analysis swing-through gait  -PB     Gait, Gait Deviations amada decreased;forward flexed posture;step length decreased  -PB     Gait, Safety Issues step length decreased;supplemental O2  -PB     Gait, Impairments impaired balance  -PB     Motor Skills/Interventions    Additional  Documentation Balance Skills Training (Group)  -PB     Balance Skills Training    Sitting-Level of Assistance Distant supervision  -PB     Sitting-Balance Support Feet supported  -PB     Standing-Level of Assistance Contact guard  -PB     Static Standing Balance Support assistive device  -PB     Gait Balance-Level of Assistance Contact guard  -PB     Gait Balance Support assistive device  -PB     Positioning and Restraints    Pre-Treatment Position in bed  -PB     Post Treatment Position chair  -PB     In Chair sitting;call light within reach;encouraged to call for assist;with family/caregiver  -       03/22/17 2052 03/22/17 2047    General Information    Equipment Currently Used at Home  oxygen;walker, standard  -    Living Environment    Lives With spouse  -     Living Arrangements house  -     Home Accessibility no concerns  -     Stair Railings at Home none  -     Type of Financial/Environmental Concern none  -     Transportation Available car  -       User Key  (r) = Recorded By, (t) = Taken By, (c) = Cosigned By    Initials Name Provider Type     EPHRAIM Soriano RN Registered Nurse    PB George Bedolla, PT DPT Physical Therapist    CJ Archer           Physical Therapy Education     Title: PT OT SLP Therapies (Active)     Topic: Physical Therapy (Active)     Point: Mobility training (Active)    Learning Progress Summary    Learner Readiness Method Response Comment Documented by Status   Patient Acceptance E NR benefits of therapy, safety concerns  03/23/17 1604 Active                      User Key     Initials Effective Dates Name Provider Type Discipline     08/02/16 -  George Bedolla, PT DPT Physical Therapist PT                PT Recommendation and Plan  Anticipated Discharge Disposition: home with assist  Planned Therapy Interventions: balance training, bed mobility training, gait training, home exercise program, patient/family education, strengthening,  transfer training  PT Frequency: daily, 2 times/day, per priority policy  Plan of Care Review  Plan Of Care Reviewed With: patient  Outcome Summary/Follow up Plan: PT justinal complete. pt able to get OOB with supervision, stood CGA, ambulated CGA with  feet. pt would benefit from continued skilled PT to address impaired functional mobility, decreased activity tolerance, and decreased balance. Anticipate D/C home with family.           IP PT Goals       03/23/17 1602          Bed Mobility PT LTG    Bed Mobility PT LTG, Date Established 03/23/17  -PB      Bed Mobility PT LTG, Time to Achieve by discharge  -PB      Bed Mobility PT LTG, Activity Type all bed mobility  -PB      Bed Mobility PT LTG, [place] Level independent  -PB      Transfer Training PT LTG    Transfer Training PT LTG, Date Established 03/23/17  -PB      Transfer Training PT LTG, Time to Achieve by discharge  -PB      Transfer Training PT LTG, Activity Type bed to chair /chair to bed;sit to stand/stand to sit  -PB      Transfer Training PT LTG, [place] Level conditional independence  -PB      Transfer Training PT LTG, Assist Device walker, rolling  -PB      Gait Training PT LTG    Gait Training Goal PT LTG, Date Established 03/23/17  -PB      Gait Training Goal PT LTG, Time to Achieve by discharge  -PB      Gait Training Goal PT LTG, [place] Level conditional independence  -PB      Gait Training Goal PT LTG, Assist Device walker, rolling  -PB      Gait Training Goal PT LTG, Distance to Achieve 350  -PB        User Key  (r) = Recorded By, (t) = Taken By, (c) = Cosigned By    Initials Name Provider Type    YUNG Bedolla, PT DPT Physical Therapist                Outcome Measures       03/23/17 1600          How much help from another person do you currently need...    Turning from your back to your side while in flat bed without using bedrails? 3  -PB      Moving from lying on back to sitting on the side of a flat bed without  bedrails? 3  -PB      Moving to and from a bed to a chair (including a wheelchair)? 3  -PB      Standing up from a chair using your arms (e.g., wheelchair, bedside chair)? 3  -PB      Climbing 3-5 steps with a railing? 3  -PB      To walk in hospital room? 3  -PB      AM-PAC 6 Clicks Score 18  -PB      Functional Assessment    Outcome Measure Options AM-PAC 6 Clicks Basic Mobility (PT)  -PB        User Key  (r) = Recorded By, (t) = Taken By, (c) = Cosigned By    Initials Name Provider Type    PB George Bedolla, PT DPT Physical Therapist           Time Calculation:         PT Charges       03/23/17 1605          Time Calculation    Start Time 1434  -PB      Stop Time 1506  -PB      Time Calculation (min) 32 min  -PB      PT Received On 03/23/17  -PB      PT Goal Re-Cert Due Date 04/02/17  -PB        User Key  (r) = Recorded By, (t) = Taken By, (c) = Cosigned By    Initials Name Provider Type    PB George Bedolla, FLAQUITA DPT Physical Therapist          Therapy Charges for Today     Code Description Service Date Service Provider Modifiers Qty    94120359840 HC PT MOBILITY CURRENT 3/23/2017 George Bedolla PT DPT GP, CK 1    05188687300 HC PT MOBILITY PROJECTED 3/23/2017 George Bedolla PT DPT GP, CJ 1    81156108425 HC PT EVAL LOW COMPLEXITY 2 3/23/2017 George Bedolla PT DPT GP 1          PT G-Codes  Outcome Measure Options: AM-PAC 6 Clicks Basic Mobility (PT)  Score: 18  Functional Limitation: Mobility: Walking and moving around  Mobility: Walking and Moving Around Current Status (): At least 40 percent but less than 60 percent impaired, limited or restricted  Mobility: Walking and Moving Around Goal Status (): At least 20 percent but less than 40 percent impaired, limited or restricted      George Bedolla PT DPT  3/23/2017

## 2017-03-23 NOTE — PLAN OF CARE
Problem: Patient Care Overview (Adult)  Goal: Plan of Care Review  Outcome: Ongoing (interventions implemented as appropriate)    03/23/17 9636   Coping/Psychosocial Response Interventions   Plan Of Care Reviewed With patient;spouse;family   Patient Care Overview   Progress no change   Outcome Evaluation   Outcome Summary/Follow up Plan Pt with unintentional wt loss r/t increased kcal/protein needs of hemodialysis and COPD as evidenced by BMI 17.7, wt loss of 59 lb (34%) in 8 months, and reported very poor appetite. Initiated supplements with all meals and educated on nutritional strategies to increased kcal/protein intake for gradual appropriate wt gain.         Problem: COPD, Chronic Bronchitis/Emphysema (Adult)  Goal: Signs and Symptoms of Listed Potential Problems Will be Absent or Manageable (COPD, Chronic Bronchitis/Emphysema)  Outcome: Ongoing (interventions implemented as appropriate)

## 2017-03-23 NOTE — PLAN OF CARE
Problem: Patient Care Overview (Adult)  Goal: Plan of Care Review  Outcome: Ongoing (interventions implemented as appropriate)    03/23/17 1602   Coping/Psychosocial Response Interventions   Plan Of Care Reviewed With patient   Outcome Evaluation   Outcome Summary/Follow up Plan PT eval complete. pt able to get OOB with supervision, stood CGA, ambulated CGA with  feet. pt would benefit from continued skilled PT to address impaired functional mobility, decreased activity tolerance, and decreased balance. Anticipate D/C home with family.          Problem: Inpatient Physical Therapy  Goal: Bed Mobility Goal LTG- PT  Outcome: Ongoing (interventions implemented as appropriate)    03/23/17 1602   Bed Mobility PT LTG   Bed Mobility PT LTG, Date Established 03/23/17   Bed Mobility PT LTG, Time to Achieve by discharge   Bed Mobility PT LTG, Activity Type all bed mobility   Bed Mobility PT LTG, [place] Level independent       Goal: Transfer Training Goal 1 LTG- PT  Outcome: Ongoing (interventions implemented as appropriate)    03/23/17 1602   Transfer Training PT LTG   Transfer Training PT LTG, Date Established 03/23/17   Transfer Training PT LTG, Time to Achieve by discharge   Transfer Training PT LTG, Activity Type bed to chair /chair to bed;sit to stand/stand to sit   Transfer Training PT LTG, [place] Level conditional independence   Transfer Training PT LTG, Assist Device walker, rolling       Goal: Gait Training Goal LTG- PT  Outcome: Ongoing (interventions implemented as appropriate)    03/23/17 1602   Gait Training PT LTG   Gait Training Goal PT LTG, Date Established 03/23/17   Gait Training Goal PT LTG, Time to Achieve by discharge   Gait Training Goal PT LTG, [place] Level conditional independence   Gait Training Goal PT LTG, Assist Device walker, rolling   Gait Training Goal PT LTG, Distance to Achieve 350

## 2017-03-23 NOTE — PLAN OF CARE
Problem: Patient Care Overview (Adult)  Goal: Plan of Care Review  Outcome: Ongoing (interventions implemented as appropriate)    03/23/17 0322   Coping/Psychosocial Response Interventions   Plan Of Care Reviewed With patient;spouse   Patient Care Overview   Progress no change   Outcome Evaluation   Outcome Summary/Follow up Plan pt. and wife oriented to room and unit. pt on cont pulse ox and BiPap, no c/o pain or distress.          Problem: COPD, Chronic Bronchitis/Emphysema (Adult)  Goal: Signs and Symptoms of Listed Potential Problems Will be Absent or Manageable (COPD, Chronic Bronchitis/Emphysema)  Outcome: Ongoing (interventions implemented as appropriate)    Problem: Fall Risk (Adult)  Goal: Identify Related Risk Factors and Signs and Symptoms  Outcome: Ongoing (interventions implemented as appropriate)  Goal: Absence of Falls  Outcome: Ongoing (interventions implemented as appropriate)    Problem: Diabetes, Type 2 (Adult)  Goal: Signs and Symptoms of Listed Potential Problems Will be Absent or Manageable (Diabetes, Type 2)  Outcome: Ongoing (interventions implemented as appropriate)

## 2017-03-23 NOTE — PROGRESS NOTES
Discharge Planning Assessment  [facility]     Patient Name: Nicolas Roberts  MRN: [id number]  Today's Date: 3/23/2017    Admit Date: 3/22/2017          Discharge Needs Assessment       03/23/17 1017    Living Environment    Provides Primary Care For (P)  no one    Primary Care Provided By (P)  spouse/significant other    Quality Of Family Relationships (P)  supportive    Able to Return to Prior Living Arrangements (P)  yes    Discharge Needs Assessment    Concerns To Be Addressed (P)  basic needs concerns;denies needs/concerns at this time    Readmission Within The Last 30 Days (P)  no previous admission in last 30 days    Equipment Currently Used at Home (P)  grab bar;commode    Equipment Needed After Discharge (P)  none    Discharge Planning Comments (P)  Spoke with patient and wife at bedside. Patient has a walker, oxygen, and grab bars in the shower along with a bedside toilet when needed. Patient's wife helps with all of patients needs and still drives. Patient has one child that helps patient with all needs. Patient denies discharge needs.             Discharge Plan     None        Discharge Placement     No information found                Demographic Summary     None            Functional Status     None            Psychosocial     None            Abuse/Neglect     None            Legal     None            Substance Abuse     None            Patient Forms     None          Rosa Archer

## 2017-03-23 NOTE — ED NOTES
4C STATES THAT ALL NURSES ARE UNABLE TO TAKE REPORT AT THIS TIME.      Cinthia Stovall, RN  03/22/17 1931

## 2017-03-23 NOTE — CONSULTS
Nephrology ([facility]) Consult Note      Patient:  Nicolas Roberts  YOB: 1945  Date of Service: 3/23/2017  MRN: [id number]   Acct: [id number]   Primary Care Physician: Rafita Harris MD  Advance Directive: Full Code  Admit Date: 3/22/2017       Hospital Day: 1  Referring Provider: Pato Alfred MD      Patient Seen, Chart, Consults, Notes, Labs, Radiology studies reviewed.        Subjective:  Nicolas Roberts is a 72 y.o. male  whom we were consulted for end stage renal disease management.  Hemodialysis Monday Wednesday Friday.  Patient admitted for progressive weakness and exacerbation of COPD.  No N/V/D.  Currently resting comfortably in bed, no dyspnea at rest but has significant shortness of breath with any exertion.    Allergies:  Contrast dye; Promethazine hcl; and Hydrocodone-acetaminophen    Home Meds:  Prescriptions Prior to Admission   Medication Sig Dispense Refill Last Dose   • albuterol (PROVENTIL HFA;VENTOLIN HFA) 108 (90 BASE) MCG/ACT inhaler Inhale 1 puff Every 4 (Four) Hours As Needed for Wheezing.   3/22/2017 at 0800   • albuterol (PROVENTIL) (2.5 MG/3ML) 0.083% nebulizer solution Take 2.5 mg by nebulization Every 6 (Six) Hours As Needed for Wheezing or Shortness of Air.   3/22/2017 at 0800   • allopurinol (ZYLOPRIM) 100 MG tablet Take 100 mg by mouth 2 (Two) Times a Day.   3/22/2017 at 0800   • amLODIPine (NORVASC) 5 MG tablet Take 5 mg by mouth 2 (Two) Times a Day.   3/21/2017 at 0800   • atorvastatin (LIPITOR) 80 MG tablet Take 80 mg by mouth Every Night.   3/21/2017 at 2000   • B Complex-C-Folic Acid (JABIER-ARIS PO) Take 1 tablet by mouth Daily.   Past Week at Unknown time   • clopidogrel (PLAVIX) 75 MG tablet Take 75 mg by mouth Daily.   3/22/2017 at 0800   • Ferric Citrate (AURYXIA) 1  MG(FE) tablet Take 1 tablet by mouth Every Night.   3/21/2017 at 2000   • Insulin Glargine (LANTUS SOLOSTAR) 100 UNIT/ML injection pen Inject 10 Units under  the skin Every Night.   3/20/2017 at 2000   • metoprolol tartrate (LOPRESSOR) 100 MG tablet Take 100 mg by mouth Daily.   3/22/2017 at 0800   • nitroglycerin (NITROSTAT) 0.4 MG SL tablet Place 0.4 mg under the tongue Every 5 (Five) Minutes As Needed for chest pain. Take no more than 3 doses in 15 minutes.   Past Month at Unknown time   • omeprazole (PriLOSEC) 20 MG capsule Take 20 mg by mouth 2 (Two) Times a Day.   3/22/2017 at 0800   • ramipril (ALTACE) 10 MG capsule Take 10 mg by mouth 2 (Two) Times a Day.   3/22/2017 at 0800       Medicines:  Current Facility-Administered Medications   Medication Dose Route Frequency Provider Last Rate Last Dose   • acetaminophen (TYLENOL) tablet 650 mg  650 mg Oral Q6H PRN Pato Alfred MD       • amLODIPine (NORVASC) tablet 5 mg  5 mg Oral Q12H Pato Alfred MD   5 mg at 03/23/17 0838   • atorvastatin (LIPITOR) tablet 80 mg  80 mg Oral Nightly Pato Alfred MD   80 mg at 03/22/17 2338   • CloNIDine (CATAPRES) tablet 0.1 mg  0.1 mg Oral Q6H PRN Pato Alfred MD       • clopidogrel (PLAVIX) tablet 75 mg  75 mg Oral Daily Pato Alfred MD   75 mg at 03/23/17 0838   • dextrose (D50W) solution 25 g  25 g Intravenous Q15 Min PRN Pato Alfred MD       • dextrose (GLUTOSE) oral gel 15 g  15 g Oral Q15 Min PRN Pato Alfred MD       • glucagon (human recombinant) (GLUCAGEN DIAGNOSTIC) injection 1 mg  1 mg Subcutaneous Q15 Min PRN Pato Alfred MD       • heparin (porcine) 5000 UNIT/ML injection 5,000 Units  5,000 Units Subcutaneous Q12H Pato Alfred MD   5,000 Units at 03/23/17 0835   • insulin detemir (LEVEMIR) injection 10 Units  10 Units Subcutaneous Nightly Pato Alfred MD   10 Units at 03/22/17 2252   • insulin lispro (humaLOG) injection 0-9 Units  0-9 Units Subcutaneous 4x Daily AC & at Bedtime Pato Alfred MD   2 Units at 03/23/17 0835   • ipratropium-albuterol (DUO-NEB) nebulizer solution 3 mL  3 mL Nebulization Q4H PRN Pato Alfred MD       • [START ON 3/24/2017]  levoFLOXacin (LEVAQUIN) 250 mg/50 mL D5W (premix) 250 mg  250 mg Intravenous Q48H MARIA LUISA Wooten [facility]       • methylPREDNISolone sodium succinate (SOLU-Medrol) injection 60 mg  60 mg Intravenous Q12H Pato Alfred MD   60 mg at 03/23/17 0615   • metoprolol tartrate (LOPRESSOR) tablet 50 mg  50 mg Oral Q12H Pato Alfred MD   50 mg at 03/23/17 0838   • nitroglycerin (NITROSTAT) SL tablet 0.4 mg  0.4 mg Sublingual Q5 Min PRN Pato Alfred MD       • ondansetron (ZOFRAN) injection 4 mg  4 mg Intravenous Q6H PRN Pato Alfred MD       • pantoprazole (PROTONIX) EC tablet 40 mg  40 mg Oral Q AM Pato Alfred MD   40 mg at 03/23/17 0618   • ramipril (ALTACE) capsule 10 mg  10 mg Oral Q12H Pato Alfred MD   10 mg at 03/23/17 0837   • sodium chloride 0.9 % flush 1-10 mL  1-10 mL Intravenous PRN Pato Alfred MD       • sodium chloride 0.9 % flush 10 mL  10 mL Intravenous PRN Zackary Dahl Jr., MD           Past Medical History:  Past Medical History:   Diagnosis Date   • CAD (coronary artery disease)    • CAD in native artery    • Cardiac device in situ      in stent restenosis 2/10 RCA   • Carotid atherosclerosis    • Carotid bruit    • Diabetes mellitus     DM, UNCOMPLICATED, TYPE II, UNCONTROLLED   • History of PTCA    • Hyperlipidemia    • Hypertension    • Pacemaker     does not have  a pacemaker    • Peripheral vascular disease        Past Surgical History:  Past Surgical History:   Procedure Laterality Date   • ARTERIOVENOUS FISTULA     • CARDIAC CATHETERIZATION Left 02/18/2010    Stent to RCA   • CAROTID STENT      RCA       Family History  Family History   Problem Relation Age of Onset   • Heart attack Father        Social History  Social History     Social History   • Marital status:      Spouse name: N/A   • Number of children: N/A   • Years of education: N/A     Occupational History   • Not on file.     Social History Main Topics   • Smoking status: Former Smoker   • Smokeless tobacco: Not on file       "Comment: quit 30 years ago    • Alcohol use 1.2 oz/week     2 Standard drinks or equivalent per week   • Drug use: No   • Sexual activity: Defer     Other Topics Concern   • Not on file     Social History Narrative         Review of Systems:  History obtained from chart review and the patient  General ROS: Patient complains of fatigue and No fever or chills  Respiratory ROS: negative for - cough or wheezing  Cardiovascular ROS: positive for - dyspnea on exertion  negative for - chest pain  Gastrointestinal ROS: No abdominal pain or melena  Genito-Urinary ROS: No dysuria or hematuria  14 point ROS reviewed with the patient and negative except as noted above and in the HPI unless unable to obtain.    Objective:  /59  Pulse 66  Temp 98.1 °F (36.7 °C)  Resp 18  Ht 67\" (170.2 cm)  Wt 113 lb (51.3 kg)  SpO2 99%  BMI 17.7 kg/m2    Intake/Output Summary (Last 24 hours) at 03/23/17 1157  Last data filed at 03/22/17 2208   Gross per 24 hour   Intake              100 ml   Output                0 ml   Net              100 ml     General: awake/alert   Chest:  clear to auscultation bilaterally without respiratory distress  CVS: regular rate and rhythm  Abdominal: soft, nontender, normal bowel sounds  Extremities: no cyanosis or edema  Skin: warm and dry without rash      Labs:    Results from last 7 days  Lab Units 03/23/17  0459 03/22/17  1738   WBC 10*3/mm3 5.37 9.35   HEMOGLOBIN g/dL 10.2* 11.5*   HEMATOCRIT % 31.8* 35.7*   PLATELETS 10*3/mm3 201 206           Results from last 7 days  Lab Units 03/23/17  0459 03/22/17  1738   SODIUM mmol/L 140 141   POTASSIUM mmol/L 4.2 3.8   CHLORIDE mmol/L 94* 94*   TOTAL CO2 mmol/L 34.0* 37.0*   BUN mg/dL 33* 18   CREATININE mg/dL 2.63* 1.85*   CALCIUM mg/dL 9.1 9.5   BILIRUBIN mg/dL  --  0.6   ALK PHOS U/L  --  101   ALT (SGPT) U/L  --  48   AST (SGOT) U/L  --  51*   GLUCOSE mg/dL 184* 137*       Radiology:   Imaging Results (last 72 hours)     Procedure Component Value " Units Date/Time    XR Chest 1 View [[id number]] Collected:  03/22/17 1807     Updated:  03/1945    Narrative:       EXAMINATION:  XR CHEST 1 VW- 3/22/2017 5:21 PM CDT     HISTORY: Short of breath.     A single view of the chest is obtained. A right pleural effusion is  present. A small left pleural effusion is present.     A right perihilar interstitial infiltrate is present. This has the  appearance of pulmonary vascular congestion. A right internal jugular  central venous catheter is present, unchanged from 12/03/2016.       Impression:       Vascular congestion with cardiomegaly and bilateral pleural effusions.  This report was finalized on 03/22/2017 19:40 by Dr. Herson Coe MD.          Culture:  Blood Culture   Date Value Ref Range Status   03/22/2017 No growth at less than 24 hours  Preliminary   03/22/2017 No growth at less than 24 hours  Preliminary         Assessment   1.  End stage renal disease on hemodialysis Monday Wednesday Friday  2.  Acute exacerbation of chronic obstructive pulmonary disease  3.  Chronic congestive heart failure  4.  Anemia of chronic disease  5.  Hypertension  6.  Diabetes mellitus type 2    Plan:  1.  Maintenance dialysis MWF  2.  Further assessment/plan following Dr Phipps's evaluation of patient      Thank you for the consult, we appreciate the opportunity to provide care to your patients.  Feel free to contact me if I can be of any further assistance.      Rob Crowder, APRN  3/23/2017  11:57 AM

## 2017-03-23 NOTE — PROGRESS NOTES
"Pharmacy Renal Dose Adjust Note    Nicolas Roberts is a 72 y.o. male [67\" (170.2 cm) 113 lb 8 oz (51.5 kg)]  COPD AE.   HD pt. MWF  Lab Results   Component Value Date    CREATININE 2.63 (H) 03/23/2017    CREATININE 1.85 (H) 03/22/2017    CREATININE 1.59 (H) 12/03/2016     Lab Results   Component Value Date    WBC 5.37 03/23/2017    WBC 9.35 03/22/2017    WBC 9.19 12/03/2016       Culture Results:  Microbiology Results (last 10 days)       Procedure Component Value - Date/Time      Blood Culture [[id number]]  (Normal) Collected:  03/22/17 1738    Lab Status:  Preliminary result Specimen:  Blood from Arm, Right Updated:  03/23/17 0601     Blood Culture No growth at less than 24 hours    Blood Culture [[id number]]  (Normal) Collected:  03/22/17 1738    Lab Status:  Preliminary result Specimen:  Blood from Arm, Right Updated:  03/23/17 0601     Blood Culture No growth at less than 24 hours          Current Abx:   IV Anti-Infectives       Ordered     Dose/Rate Route Frequency Start Stop      03/23/17 0835  levoFLOXacin (LEVAQUIN) 250 mg/50 mL D5W (premix) 250 mg     Ordering Provider:  MARIA LUISA Ho RPH    250 mg Intravenous Every 48 Hours 03/24/17 2200            Assessment/Plan:  Levaquin dose adjusted from 500mg IV q48 to 250mg IV q48 based on indication and ESRD- HD. Pharmacy will continue to follow daily and adjust as needed.     RANDY Ho, Pharm.D.    03/23/17 8:36 AM     "

## 2017-03-23 NOTE — PLAN OF CARE
Problem: Patient Care Overview (Adult)  Goal: Plan of Care Review    03/23/17 1619   Coping/Psychosocial Response Interventions   Plan Of Care Reviewed With patient   Patient Care Overview   Progress improving   Outcome Evaluation   Outcome Summary/Follow up Plan 2D echo today results pending, Nephrology consult, PT consult, BIPAP prn, patient currently on 2L, no c/o pain         Problem: COPD, Chronic Bronchitis/Emphysema (Adult)  Goal: Signs and Symptoms of Listed Potential Problems Will be Absent or Manageable (COPD, Chronic Bronchitis/Emphysema)  Outcome: Ongoing (interventions implemented as appropriate)    Problem: Fall Risk (Adult)  Goal: Identify Related Risk Factors and Signs and Symptoms  Outcome: Ongoing (interventions implemented as appropriate)  Goal: Absence of Falls  Outcome: Ongoing (interventions implemented as appropriate)    Problem: Diabetes, Type 2 (Adult)  Goal: Signs and Symptoms of Listed Potential Problems Will be Absent or Manageable (Diabetes, Type 2)  Outcome: Ongoing (interventions implemented as appropriate)

## 2017-03-23 NOTE — PROGRESS NOTES
Malnutrition Severity Assessment    Patient Name:  Nicolas Roberts  YOB: 1945  MRN: [id number]  Admit Date:  3/22/2017    Patient meets criteria for : Moderate malnutrition    Comments:  Patient with diagnosis of ESRD on HD and with COPD, both of which increase kcal and protein needs. Pt has lost a total of 59 lbs (34%) since July 2016.  Pt and family report very poor appetite with increased weakness.  Patient has used some supplements at home; RD ordered Ensure Clear daily and Ensure Enlive BID while in hospital, as well as educated Patient and family on nutritional strategies to increase kcal/protein intake with long term goal of appropriate weight gain.    If in agreement with malnutrition assessment, please attest documentation.       Malnutrition Type: Chronic Illness Malnutrition     Malnutrition Type (last 8 hours)      Malnutrition Severity Assessment       03/23/17 1537    Physical Signs of Malnutrition (Chronic)    Muscle Wasting Mild    Fluid Accumulation Mild   mild--dependant/ankles/feet    Secondary Physical Signs Present (comment)   weakness      03/23/17 1537    Malnutrition Severity Assessment    Malnutrition Type Chronic Illness Malnutrition          Physical Signs of Malnutrition         Most Recent Value    Muscle Wasting  Mild    Fluid Accumulation  Mild [mild--dependant/ankles/feet]    Secondary Physical Signs  Present (comment) [weakness]      Weight Status         Most Recent Value    BMI  Mild (<19) [17.7]    %IBW  Mod <80% [76%]    %UBW  Severe (<75%) [66%]    Weight Loss  Severe (>20% / 1 yr) [6.6%/6 wks,  18%/3 months,  34%/8months]              Electronically signed by:  Sophia Manzo RDN, HO  03/23/17 3:40 PM

## 2017-03-24 LAB
ANION GAP SERPL CALCULATED.3IONS-SCNC: 11 MMOL/L (ref 4–13)
BUN BLD-MCNC: 65 MG/DL (ref 5–21)
BUN/CREAT SERPL: 17.5 (ref 7–25)
CALCIUM SPEC-SCNC: 9.3 MG/DL (ref 8.4–10.4)
CHLORIDE SERPL-SCNC: 94 MMOL/L (ref 98–110)
CO2 SERPL-SCNC: 31 MMOL/L (ref 24–31)
CREAT BLD-MCNC: 3.71 MG/DL (ref 0.5–1.4)
DEPRECATED RDW RBC AUTO: 48.8 FL (ref 40–54)
ERYTHROCYTE [DISTWIDTH] IN BLOOD BY AUTOMATED COUNT: 14 % (ref 12–15)
GFR SERPL CREATININE-BSD FRML MDRD: 16 ML/MIN/1.73
GLUCOSE BLD-MCNC: 136 MG/DL (ref 70–100)
GLUCOSE BLDC GLUCOMTR-MCNC: 125 MG/DL (ref 70–130)
GLUCOSE BLDC GLUCOMTR-MCNC: 129 MG/DL (ref 70–130)
GLUCOSE BLDC GLUCOMTR-MCNC: 213 MG/DL (ref 70–130)
GLUCOSE BLDC GLUCOMTR-MCNC: 232 MG/DL (ref 70–130)
HCT VFR BLD AUTO: 31.4 % (ref 40–52)
HGB BLD-MCNC: 10.2 G/DL (ref 14–18)
MCH RBC QN AUTO: 31 PG (ref 28–32)
MCHC RBC AUTO-ENTMCNC: 32.5 G/DL (ref 33–36)
MCV RBC AUTO: 95.4 FL (ref 82–95)
PLATELET # BLD AUTO: 221 10*3/MM3 (ref 130–400)
PMV BLD AUTO: 9.9 FL (ref 6–12)
POTASSIUM BLD-SCNC: 4.8 MMOL/L (ref 3.5–5.3)
RBC # BLD AUTO: 3.29 10*6/MM3 (ref 4.8–5.9)
SODIUM BLD-SCNC: 136 MMOL/L (ref 135–145)
WBC NRBC COR # BLD: 13.71 10*3/MM3 (ref 4.8–10.8)

## 2017-03-24 PROCEDURE — 80048 BASIC METABOLIC PNL TOTAL CA: CPT | Performed by: NURSE PRACTITIONER

## 2017-03-24 PROCEDURE — 63710000001 INSULIN DETEMIR PER 5 UNITS: Performed by: INTERNAL MEDICINE

## 2017-03-24 PROCEDURE — 25010000002 HEPARIN (PORCINE) PER 1000 UNITS: Performed by: INTERNAL MEDICINE

## 2017-03-24 PROCEDURE — 94640 AIRWAY INHALATION TREATMENT: CPT

## 2017-03-24 PROCEDURE — 25010000002 METHYLPREDNISOLONE PER 125 MG: Performed by: INTERNAL MEDICINE

## 2017-03-24 PROCEDURE — 94660 CPAP INITIATION&MGMT: CPT

## 2017-03-24 PROCEDURE — 94799 UNLISTED PULMONARY SVC/PX: CPT

## 2017-03-24 PROCEDURE — 82962 GLUCOSE BLOOD TEST: CPT

## 2017-03-24 PROCEDURE — 85027 COMPLETE CBC AUTOMATED: CPT | Performed by: NURSE PRACTITIONER

## 2017-03-24 RX ORDER — BUDESONIDE AND FORMOTEROL FUMARATE DIHYDRATE 160; 4.5 UG/1; UG/1
2 AEROSOL RESPIRATORY (INHALATION)
Status: DISCONTINUED | OUTPATIENT
Start: 2017-03-24 | End: 2017-03-28 | Stop reason: HOSPADM

## 2017-03-24 RX ORDER — DIAZEPAM 2 MG/1
2 TABLET ORAL EVERY 8 HOURS PRN
Status: DISCONTINUED | OUTPATIENT
Start: 2017-03-24 | End: 2017-03-28 | Stop reason: HOSPADM

## 2017-03-24 RX ORDER — METHYLPREDNISOLONE SODIUM SUCCINATE 40 MG/ML
40 INJECTION, POWDER, LYOPHILIZED, FOR SOLUTION INTRAMUSCULAR; INTRAVENOUS EVERY 12 HOURS
Status: DISCONTINUED | OUTPATIENT
Start: 2017-03-24 | End: 2017-03-24

## 2017-03-24 RX ORDER — POLYETHYLENE GLYCOL 3350 17 G/17G
17 POWDER, FOR SOLUTION ORAL ONCE
Status: COMPLETED | OUTPATIENT
Start: 2017-03-24 | End: 2017-03-24

## 2017-03-24 RX ORDER — HEPARIN SODIUM 1000 [USP'U]/ML
1800 INJECTION, SOLUTION INTRAVENOUS; SUBCUTANEOUS AS NEEDED
Status: DISCONTINUED | OUTPATIENT
Start: 2017-03-24 | End: 2017-03-28 | Stop reason: HOSPADM

## 2017-03-24 RX ORDER — DOCUSATE SODIUM 100 MG/1
100 CAPSULE, LIQUID FILLED ORAL 2 TIMES DAILY
Status: DISCONTINUED | OUTPATIENT
Start: 2017-03-24 | End: 2017-03-28 | Stop reason: HOSPADM

## 2017-03-24 RX ORDER — PREDNISONE 20 MG/1
20 TABLET ORAL
Status: DISCONTINUED | OUTPATIENT
Start: 2017-03-25 | End: 2017-03-27

## 2017-03-24 RX ORDER — LEVOFLOXACIN 250 MG/1
250 TABLET ORAL EVERY OTHER DAY
Status: DISCONTINUED | OUTPATIENT
Start: 2017-03-24 | End: 2017-03-28 | Stop reason: HOSPADM

## 2017-03-24 RX ADMIN — METOPROLOL TARTRATE 50 MG: 50 TABLET ORAL at 21:07

## 2017-03-24 RX ADMIN — DIAZEPAM 2 MG: 2 TABLET ORAL at 22:37

## 2017-03-24 RX ADMIN — INSULIN DETEMIR 10 UNITS: 100 INJECTION, SOLUTION SUBCUTANEOUS at 21:08

## 2017-03-24 RX ADMIN — DIAZEPAM 2 MG: 2 TABLET ORAL at 02:43

## 2017-03-24 RX ADMIN — DOCUSATE SODIUM 100 MG: 100 CAPSULE ORAL at 17:52

## 2017-03-24 RX ADMIN — PANTOPRAZOLE SODIUM 40 MG: 40 TABLET, DELAYED RELEASE ORAL at 06:39

## 2017-03-24 RX ADMIN — INSULIN LISPRO 4 UNITS: 100 INJECTION, SOLUTION INTRAVENOUS; SUBCUTANEOUS at 17:52

## 2017-03-24 RX ADMIN — HEPARIN SODIUM 1800 UNITS: 1000 INJECTION, SOLUTION INTRAVENOUS; SUBCUTANEOUS at 12:30

## 2017-03-24 RX ADMIN — LEVOFLOXACIN 250 MG: 250 TABLET, FILM COATED ORAL at 17:57

## 2017-03-24 RX ADMIN — HEPARIN SODIUM 5000 UNITS: 5000 INJECTION, SOLUTION INTRAVENOUS; SUBCUTANEOUS at 13:06

## 2017-03-24 RX ADMIN — AMLODIPINE BESYLATE 5 MG: 5 TABLET ORAL at 21:07

## 2017-03-24 RX ADMIN — METOPROLOL TARTRATE 50 MG: 50 TABLET ORAL at 13:07

## 2017-03-24 RX ADMIN — DESMOPRESSIN ACETATE 80 MG: 0.2 TABLET ORAL at 21:07

## 2017-03-24 RX ADMIN — METHYLPREDNISOLONE SODIUM SUCCINATE 60 MG: 125 INJECTION, POWDER, FOR SOLUTION INTRAMUSCULAR; INTRAVENOUS at 06:39

## 2017-03-24 RX ADMIN — AMLODIPINE BESYLATE 5 MG: 5 TABLET ORAL at 13:07

## 2017-03-24 RX ADMIN — INSULIN LISPRO 4 UNITS: 100 INJECTION, SOLUTION INTRAVENOUS; SUBCUTANEOUS at 21:08

## 2017-03-24 RX ADMIN — HEPARIN SODIUM 5000 UNITS: 5000 INJECTION, SOLUTION INTRAVENOUS; SUBCUTANEOUS at 21:07

## 2017-03-24 RX ADMIN — POLYETHYLENE GLYCOL (3350) 17 G: 17 POWDER, FOR SOLUTION ORAL at 16:06

## 2017-03-24 RX ADMIN — BUDESONIDE AND FORMOTEROL FUMARATE DIHYDRATE 2 PUFF: 160; 4.5 AEROSOL RESPIRATORY (INHALATION) at 19:54

## 2017-03-24 RX ADMIN — CLOPIDOGREL BISULFATE 75 MG: 75 TABLET, FILM COATED ORAL at 13:07

## 2017-03-24 RX ADMIN — HEPARIN SODIUM 1800 UNITS: 1000 INJECTION, SOLUTION INTRAVENOUS; SUBCUTANEOUS at 12:31

## 2017-03-24 NOTE — PROGRESS NOTES
[facility]  INPATIENT PROGRESS NOTE    Length of Stay: 2  Date of Admission: 3/22/2017  Primary Care Physician: Rafita Harris MD    Subjective   Chief Complaint: Follow up     HPI   The patient is currently sitting up in bed. He states that he feels a little bit better but not much. He states that he really thinks he will go to rehab at discharge because he is weaker than he has ever been. He is doing better. Wife at bedside. Having some constipation.    Review of Systems   Constitutional: Positive for activity change, appetite change and fatigue. Negative for chills.   HENT: Negative for hearing loss, nosebleeds, tinnitus and trouble swallowing.    Eyes: Negative for visual disturbance.   Respiratory: Negative for cough and chest tightness.    Cardiovascular: Negative for palpitations.   Gastrointestinal: Negative for abdominal distention, blood in stool, constipation, diarrhea and nausea.   Endocrine: Negative for cold intolerance, heat intolerance, polydipsia, polyphagia and polyuria.   Genitourinary: Negative for decreased urine volume, difficulty urinating, dysuria, flank pain, frequency and hematuria.   Musculoskeletal: Positive for myalgias. Negative for arthralgias and joint swelling.   Allergic/Immunologic: Negative for immunocompromised state.   Neurological: Positive for weakness. Negative for dizziness, syncope and light-headedness.   Hematological: Negative for adenopathy. Does not bruise/bleed easily.   Psychiatric/Behavioral: Negative for confusion and sleep disturbance. The patient is not nervous/anxious.       All pertinent negatives and positives are as above. All other systems have been reviewed and are negative unless otherwise stated.     Objective    Temp:  [97.6 °F (36.4 °C)-98.6 °F (37 °C)] 98.6 °F (37 °C)  Heart Rate:  [56-70] 64  Resp:  [16-20] 19  BP: (130-152)/(51-55) 144/53     Physical Exam   Constitutional: He is oriented to  person, place, and time. He appears cachectic. He appears ill.   Frail    HENT:   Head: Normocephalic and atraumatic.   Eyes: Conjunctivae and EOM are normal. Pupils are equal, round, and reactive to light.   Neck: Neck supple. No JVD present. No thyromegaly present.   Cardiovascular: Normal rate, regular rhythm, normal heart sounds and intact distal pulses.  Exam reveals no gallop and no friction rub.    No murmur heard.  Pulmonary/Chest: Effort normal. No respiratory distress. He has wheezes. He has rales. He exhibits no tenderness.   improved   Abdominal: Soft. Bowel sounds are normal. He exhibits no distension. There is no tenderness. There is no rebound and no guarding.   Musculoskeletal: Normal range of motion. He exhibits no edema, tenderness or deformity.   Lymphadenopathy:     He has no cervical adenopathy.   Neurological: He is alert and oriented to person, place, and time. He displays normal reflexes. No cranial nerve deficit. He exhibits normal muscle tone.   Skin: Skin is warm and dry. No rash noted. There is cyanosis.   Psychiatric: He has a normal mood and affect. His behavior is normal. Judgment and thought content normal.   Vitals reviewed.    Results Review:  I have reviewed the labs, radiology results, and diagnostic studies.    Laboratory Data:     Results from last 7 days  Lab Units 03/24/17  0602 03/23/17  0459 03/22/17  1738   WBC 10*3/mm3 13.71* 5.37 9.35   HEMOGLOBIN g/dL 10.2* 10.2* 11.5*   HEMATOCRIT % 31.4* 31.8* 35.7*   PLATELETS 10*3/mm3 221 201 206       Results from last 7 days  Lab Units 03/24/17  0602 03/23/17  0459 03/22/17  1738   SODIUM mmol/L 136 140 141   POTASSIUM mmol/L 4.8 4.2 3.8   CHLORIDE mmol/L 94* 94* 94*   TOTAL CO2 mmol/L 31.0 34.0* 37.0*   BUN mg/dL 65* 33* 18   CREATININE mg/dL 3.71* 2.63* 1.85*   CALCIUM mg/dL 9.3 9.1 9.5   BILIRUBIN mg/dL  --   --  0.6   ALK PHOS U/L  --   --  101   ALT (SGPT) U/L  --   --  48   AST (SGOT) U/L  --   --  51*   GLUCOSE mg/dL 136*  184* 137*     Culture Data:   Blood Culture   Date Value Ref Range Status   03/22/2017 No growth at less than 24 hours  Preliminary   03/22/2017 No growth at less than 24 hours  Preliminary     Radiology Data:   Imaging Results (last 24 hours)     Procedure Component Value Units Date/Time    XR Chest 1 View [[id number]] Collected:  03/22/17 1807     Updated:  03/1945    Narrative:       EXAMINATION:  XR CHEST 1 VW- 3/22/2017 5:21 PM CDT     HISTORY: Short of breath.     A single view of the chest is obtained. A right pleural effusion is  present. A small left pleural effusion is present.     A right perihilar interstitial infiltrate is present. This has the  appearance of pulmonary vascular congestion. A right internal jugular  central venous catheter is present, unchanged from 12/03/2016.       Impression:       Vascular congestion with cardiomegaly and bilateral pleural effusions.  This report was finalized on 03/22/2017 19:40 by Dr. Herson Coe MD.        I have reviewed the patient current medications.     Assessment/Plan     Hospital Problem List     Chronic obstructive pulmonary disease with acute exacerbation      Assessment:  1. Acute dyspnea secondary to COPD exacerbation.   2. Hypertension.   3. Dyslipidemia.   4. Diabetes mellitus type 2.   5. Coronary artery disease.   6. Chronic congestive heart failure of uncertain type.   7. COPD.   8. Severe pulmonary hypertension.   9. End-stage renal disease necessitating hemodialysis.   10. Obstructive sleep apnea.   11. Anemia due to chronic disease likely related to chronic kidney disease.   12. Bilateral pleural effusions   13. Constipation     Plan:  1. Colace and Miralax added  2. Labs in AM  3. IV steroids decreased  4. Continue Levaquin  5. Duonebs, mucinex continue  6. Continue PT  7. Bipap PRN    Discharge Planning: I expect patient to be discharged to home/rehab in 2-4 days.    Bernardo Alfonso, CHARO   03/24/17   3:05 PM    I personally evaluated  and examined the patient in conjunction with CHARO Hernadez and agree with the assessment, treatment plan, and disposition of the patient as recorded by her. My history, exam, and further recommendations are: Given patient's history, in addition to his chest x-ray findings, I am concerned that the main source of his shortness of breath is likely volume related.  Patient does report orthopnea.  He does have an underlying history of COPD, and is followed by Dr. Martinez with pulmonary medicine on an outpatient basis.  I am going to transition from IV to oral antibiotics, in addition to transitioning to oral steroid therapy.  He also states that in the past he has been on Advair which is really improved his breathing symptoms, and for that reason I will start him on a trial of Symbicort.  I will also plan to repeat a chest x-ray in the morning tomorrow to follow-up on the changes of edema and pleural effusions which were seen on his initial chest x-ray.    Tiburcio Maguire MD  03/24/17  4:58 PM

## 2017-03-24 NOTE — DISCHARGE PLACEMENT REQUEST
"Kate Perea [facility]  [phone]    Nicolas Dasilva (72 y.o. Male)     Date of Birth Social Security Number Address Home Phone MRN    1945  [address] [place] [id number] [phone] [id number]    Adventism Marital Status          Other        Admission Date Admission Type Admitting Provider Attending Provider Department, Room/Bed    3/22/17 Emergency Tiburcio Maguire MD Thompson, Benjamin H, MD [address], 481/1    Discharge Date Discharge Disposition Discharge Destination                      Attending Provider: Tiburcio Maguire MD     Allergies:  Contrast Dye, Promethazine Hcl, Hydrocodone-acetaminophen    Isolation:  None   Infection:  None   Code Status:  FULL    Ht:  67\" (170.2 cm)   Wt:  114 lb 12.8 oz (52.1 kg)    Admission Cmt:  None   Principal Problem:  None                Active Insurance as of 3/22/2017     Primary Coverage     Payor Plan Insurance Group Employer/Plan Group    MEDICARE MEDICARE A & B      Payor Plan Address Payor Plan Phone Number Effective From Effective To    [address] [phone] 1/1/2008     [place] [address]       Subscriber Name Subscriber Birth Date Member ID       NICOLAS DASILVA 1945 [id number]           Secondary Coverage     Payor Plan Insurance Group Employer/Plan Group    [employer]      Payor Plan Address Payor Plan Phone Number Effective From Effective To    [address] [phone] 1/1/2008     [place] [address]       Subscriber Name Subscriber Birth Date Member ID       NICOLAS DASILVA 1945 [id number]                 Emergency Contacts      (Rel.) Home Phone Work Phone Mobile Phone    Da Dasilva (Spouse) [phone] -- --               History & Physical      H&P signed by Pato Alfred MD at 3/23/2017  5:03 AM              TIME: 9:31 p.m.    PRIMARY CARE PHYSICIAN: Rafita Harris MD    HISTORY OF PRESENT ILLNESS: Mr. Dasilva is a " 72-year-old  male who presents to [facility] due to a multiplicity of complaints including fatigue, malaise, generalized weakness, poor appetite, weight loss, and shortness of breath. His symptoms have been present and worsening for at least 1 week. Mr. Roberts undergoes hemodialysis on Monday, Wednesdays and Fridays. Today, after dialysis, he was much weaker than usual. He was directed to the emergency department. Laboratory and imaging studies are consistent with acute respiratory insufficiency, likely associated with congestive heart failure. Chest x-ray demonstrates vascular congestion with cardiomegaly and bilateral pleural effusions. Mr. Roberts will require admission to the hospital for further evaluation and treatment. Presently, he is resting comfortably and is in no distress. However, he is profoundly weak. His wife is present at the bedside.     REVIEW OF SYSTEMS: Essentially unremarkable from a cardiovascular, pulmonary, gastrointestinal, genitourinary, neurologic, psychiatric, metabolic and constitutional standpoint except as noted. The patient relates chronic and worsening fatigue, malaise, lethargy, and weakness. He has had no recent fevers, chills, or sweats. He relates a poor appetite with weight loss. He relates having intermittent chest pain, although this does not appear to be a major complaint. He has had no chest palpitations. He relates worsening shortness of breath. He has had no lower extremity edema, orthopnea, cough, wheezing, or hemoptysis. He has had no abdominal pain, nausea, vomiting, diarrhea. He relates recurring constipation. He has had no dyspepsia, dysphagia, odynophagia, hematemesis, hematochezia, or melena. He has had no pelvic pain, flank pain, hematuria, or dysuria. He has had no skin rashes, arthralgias, myalgias, or swollen joints. He has had no headache, confusion, memory deficits or loss of consciousness. He has had no recent changes in his vision  or hearing. He has had no acute motor or sensory deficits. He has had no tremor. He relates having an unsteady gait and has suffered several falls recently.     PAST MEDICAL HISTORY:  1.  Hypertension.   2.  Dyslipidemia.   3.  Diabetes mellitus type 2.   4.  Coronary artery disease.   5.  Chronic congestive heart failure of uncertain type.   6.  COPD.   7.  Severe pulmonary hypertension.   8.  Obstructive sleep apnea necessitating CPAP therapy.   9.  Chronic hypoxic respiratory failure necessitating home oxygen use.   10.  End-stage renal disease necessitating hemodialysis.   11.  Gastroesophageal reflux disease.   12.  Hiatal hernia.   13.  Sylvester's esophagus.   14.  Gout.   15.  Raynaud’s phenomenon.   16.  Anemia of chronic disease, likely related to chronic kidney disease.   17.  Hearing impairment.   18.  Renal artery stenosis.   19.  Bilateral carotid stenosis.   20.  Hearing impairment.     PAST SURGICAL HISTORY:  1.  Status post cardiac stent deployment.   2.  Status post renal artery stent deployment.   3.  Status post sinus surgery of uncertain type.   4.  Status post bilateral carotid endarterectomy.   5.  Status post left inguinal hernia repair.     HOME MEDICATIONS:  1.  Albuterol 1-4 inhalations q.4 h. p.r.n. for shortness of breath and/or wheezing.   2.  Albuterol nebulizer 1 unit q.6 h. p.r.n. for shortness of breath and/or wheezing.   3.  Allopurinol 100 mg p.o. b.i.d.   4.  Norvasc 5 mg p.o. b.i.d.   5.  Lipitor 80 mg p.o. at bedtime.   6.  Debra-Rose 1 p.o. daily.   7.  Plavix 75 mg p.o. daily.   8.  Auryxia 1 p.o. daily.   9.  Lantus insulin 10 units subcutaneous at bedtime.   10.  Metoprolol 100 mg p.o. daily.   11.  Nitroglycerin sublingual tablets 0.4 mg p.o. p.r.n. for chest pain.   12.  Prilosec 20 mg p.o. b.i.d.   13.  Altace 10 mg p.o. b.i.d.     ALLERGIES:  1.  CONTRAST DYE.  2.  PROMETHAZINE.  3.  HYDROCODONE.    SOCIAL HISTORY: Significant for being a resident of [place], [place]. He  is . He has 2 daughters in good health, although 1 has a history of diabetes. He is a retired . He has an 11th grade education. He smoked a variable amount of tobacco for approximately 25 years, but quit smoking approximately 34 years ago. He drinks Coors beer on rare occasions, but not regularly. He has no history of illicit drug use. He describes his Synagogue affiliation as Hinduism. He has no recent history of travel outside this region.     He is a FULL CODE.     He designates his wife Da to serve as a surrogate for healthcare matters should such become necessary.     FAMILY HISTORY: Significant for having a brother in apparent good health. His father is  due to heart attack. His mother is  due to lymphoma of uncertain type.     PHYSICAL EXAMINATION:    VITAL SIGNS: Temperature is 98.3, pulse 75, respirations are 20 and unlabored, blood pressure is 169/63 and O2 saturations are 90% breathing supplemental oxygen, weight is 113 pounds.     GENERAL: This is a 72-year-old  male appearing his documented age. He is resting comfortably in bed. He is in no apparent distress. He is requesting a diet. He appears chronically ill. He is hearing impaired. He proves to be a poor historian. His wife is present at the bedside.     HEAD AND NECK: Essentially unremarkable except as noted. He has contusion on his left cheek. Eyes, nose, and throat are essentially unremarkable. Sclerae are clear. There is no discharge in the nostrils. Mucous membranes are moist. Neck is supple. He has no cervical or clavicular adenopathy. He has bilateral carotid bruits. There are no masses of the head or neck. Neck veins do not appear pathologically distended.     CARDIAC: Reveals S1 and S2 with a regular rhythm. He has no definite murmurs, rubs, or gallops.     LUNGS: Reveals bilateral breath sounds that are clear to auscultation throughout. He has no rales, wheezes, or rhonchi.     ABDOMEN:  Reveals bowel sounds to be present. His abdomen is nontender, nondistended and soft.     EXTREMITIES: No lower extremity edema, erythema or calf tenderness.     NEUROLOGIC: Reveals the patient to be awake and alert. He seems oriented to person, place, time and situation. Cranial nerves II-XII appear grossly intact. He exhibits no definite focal, motor or sensory deficits. He is profoundly weak. His gait was not tested.     PSYCHIATRIC: Reveals his mood to be stable. Affect seems appropriate. Thought processes are organized in that he is able to answer questions and provide somewhat of a history. There is no flight of ideas. Speech is fluent. There are no obvious short-term or long-term memory deficits. Note that psychiatric examination is suboptimal due to the patient's hearing impairment. He also has a speech impediment.     DIAGNOSTIC DATA: Sodium 141, potassium 3.8, chloride 94, CO2 of 37, BUN 18, creatinine 1.85, glucose 137, total calcium 9.5.     Liver function testing is essentially unremarkable except for an AST of 51.     CBC demonstrates a white blood cell count of 9.35, hemoglobin 11.5, hematocrit 35.7, platelet count of 206,000.     Arterial blood gas demonstrates a pH of 7.327, pCO2 of 65.9, pO2 of 90 and O2 saturation is 96%.     Chest x-ray demonstrates vascular congestion with cardiomegaly and bilateral pleural effusions.     EKG demonstrates sinus rhythm of 77 beats per minute. There is evidence of LVH.     IMPRESSION:  1.  Acute dyspnea secondary to COPD exacerbation.   2.  Hypertension.   3.  Dyslipidemia.   4.  Diabetes mellitus type 2.   5.  Coronary artery disease.   6.  Chronic congestive heart failure of uncertain type.   7.  COPD.   8.  Severe pulmonary hypertension.   9.  End-stage renal disease necessitating hemodialysis.   10.  Obstructive sleep apnea.   11.  Anemia due to chronic disease likely related to chronic kidney disease.     PLAN: At this time, Mr. Roberts will be admitted to  [facility] for further evaluation and treatment. His multiple admitting diagnoses are as noted. His condition at this time is guarded. His prognosis is poor.     I have asked the nursing staff to obtain vital signs per protocol. He will be confined to bedrest. His allergies to CONTRAST DYE, PROMETHAZINE and HYDROCODONE are noted. I have asked the nursing staff to monitor input and output. Daily weights will be obtained. He will be maintained on a renal diet. IV fluids will be saline locked. Oxygen will be used as needed to maintain his O2 saturations greater than 92%. He is a FULL CODE. Fall precautions are to be initiated.     I will consult the Nephrology service.     INITIAL ADMITTING MEDICATIONS:  1.  Allopurinol 100 mg p.o. b.i.d.   2.  Norvasc 5 mg p.o. q.12 h.   3.  Lipitor 80 mg p.o. daily.   4.  Plavix 75 mg p.o. daily.   5.  Heparin 5000 units subcutaneous q.12 h.   6.  Levemir 10 units subcutaneous at bedtime.   7.  Levaquin 500 mg IV q.48 h.   8.  Solu-Medrol 60 mg IV q.12 h.   9.  Metoprolol 50 mg p.o. q.12 h.   10.  Altace 10 mg p.o. b.i.d.   11.  Tylenol 650 mg p.o. q.6 h. p.r.n. for fever and/or discomfort.   12.  DuoNeb 1 unit q.4 h. p.r.n. for shortness of breath.   13.  Nitroglycerin sublingual tablets 0.4 mg p.o. p.r.n. for chest pain.   14.  Zofran 4 mg IV q.6 h. p.r.n. for nausea and vomiting.     I will institute BiPAP therapy.     I will obtain followup laboratory studies in the morning.     I will continue to follow Mr. Roberts closely through the night pending return of the hospitalist team in the morning. The nursing staff may call should they have any questions or concerns. Please refer to the medical record for additional information, orders and/or comments.       cc:  KOBE STEPHENSON M.D. JRP/[id number]  D:  03/22/2017 22:48:49(Eastern Time)  T:  03/22/2017 23:27:54(Eastern Time)  Voice ID:  [id number]/Document ID:  [id number]       Electronically  signed by Pato Alfred MD at 3/23/2017  5:03 AM        Hospital Medications (active)       Dose Frequency Start End    acetaminophen (TYLENOL) tablet 650 mg 650 mg Every 6 Hours PRN 3/22/2017     Sig - Route: Take 2 tablets by mouth Every 6 (Six) Hours As Needed for Mild Pain (1-3) or Fever. - Oral    amLODIPine (NORVASC) tablet 5 mg 5 mg Every 12 Hours Scheduled 3/22/2017     Sig - Route: Take 1 tablet by mouth Every 12 (Twelve) Hours. - Oral    atorvastatin (LIPITOR) tablet 80 mg 80 mg Nightly 3/22/2017     Sig - Route: Take 2 tablets by mouth Every Night. - Oral    CloNIDine (CATAPRES) tablet 0.1 mg 0.1 mg Every 6 Hours PRN 3/22/2017     Sig - Route: Take 1 tablet by mouth Every 6 (Six) Hours As Needed (For SBP greater than 160.  Hold for HR less 65, or less.). - Oral    clopidogrel (PLAVIX) tablet 75 mg 75 mg Daily 3/23/2017     Sig - Route: Take 1 tablet by mouth Daily. - Oral    dextrose (D50W) solution 25 g 25 g Every 15 Minutes PRN 3/22/2017     Sig - Route: Infuse 50 mL into a venous catheter Every 15 (Fifteen) Minutes As Needed for Low Blood Sugar (Blood Sugar Less Than 70, Patient Has IV Access - Unresponsive, NPO or Unable To Safely Swallow). - Intravenous    dextrose (GLUTOSE) oral gel 15 g 15 g Every 15 Minutes PRN 3/22/2017     Sig - Route: Take 15 g by mouth Every 15 (Fifteen) Minutes As Needed for Low Blood Sugar (Blood Sugar Less Than 70, Patient Alert, Is Not NPO & Can Safely Swallow). - Oral    diazePAM (VALIUM) tablet 2 mg 2 mg Every 8 Hours PRN 3/24/2017 4/3/2017    Sig - Route: Take 1 tablet by mouth Every 8 (Eight) Hours As Needed for Anxiety. - Oral    glucagon (human recombinant) (GLUCAGEN DIAGNOSTIC) injection 1 mg 1 mg Every 15 Minutes PRN 3/22/2017     Sig - Route: Inject 1 mg under the skin Every 15 (Fifteen) Minutes As Needed (Blood Glucose Less Than 70 - Patient Without IV Access - Unresponsive, NPO or Unable To Safely Swallow). - Subcutaneous    heparin (porcine) 5000 UNIT/ML  injection 5,000 Units 5,000 Units Every 12 Hours Scheduled 3/22/2017     Sig - Route: Inject 1 mL under the skin Every 12 (Twelve) Hours. - Subcutaneous    heparin (porcine) injection 1,800 Units 1,800 Units As Needed 3/24/2017     Sig - Route: 1.8 mL by Intracatheter route As Needed (permcath packing after EVERY HD). - Intracatheter    heparin (porcine) injection 1,800 Units 1,800 Units As Needed 3/24/2017     Sig - Route: 1.8 mL by Intracatheter route As Needed (1.8 cc after every hd tx). - Intracatheter    insulin detemir (LEVEMIR) injection 10 Units 10 Units Nightly 3/22/2017     Sig - Route: Inject 10 Units under the skin Every Night. - Subcutaneous    insulin lispro (humaLOG) injection 0-9 Units 0-9 Units 4 Times Daily Before Meals & Nightly 3/22/2017     Sig - Route: Inject 0-9 Units under the skin 4 (Four) Times a Day Before Meals & at Bedtime. - Subcutaneous    ipratropium-albuterol (DUO-NEB) nebulizer solution 3 mL 3 mL Every 4 Hours PRN 3/22/2017     Sig - Route: Take 3 mL by nebulization Every 4 (Four) Hours As Needed for Wheezing or Shortness of Air. - Nebulization    levoFLOXacin (LEVAQUIN) 250 mg/50 mL D5W (premix) 250 mg 250 mg Every 48 Hours 3/24/2017     Sig - Route: Infuse 50 mL into a venous catheter Every Other Day. - Intravenous    methylPREDNISolone sodium succinate (SOLU-Medrol) injection 60 mg 60 mg Every 12 Hours 3/23/2017     Sig - Route: Infuse 0.96 mL into a venous catheter Every 12 (Twelve) Hours. - Intravenous    metoprolol tartrate (LOPRESSOR) tablet 50 mg 50 mg Every 12 Hours Scheduled 3/22/2017     Sig - Route: Take 1 tablet by mouth Every 12 (Twelve) Hours. - Oral    nitroglycerin (NITROSTAT) SL tablet 0.4 mg 0.4 mg Every 5 Minutes PRN 3/22/2017     Sig - Route: Place 1 tablet under the tongue Every 5 (Five) Minutes As Needed for Chest Pain. - Sublingual    ondansetron (ZOFRAN) injection 4 mg 4 mg Every 6 Hours PRN 3/22/2017     Sig - Route: Infuse 2 mL into a venous catheter Every  "6 (Six) Hours As Needed for Nausea or Vomiting. - Intravenous    pantoprazole (PROTONIX) EC tablet 40 mg 40 mg Every Early Morning 3/23/2017     Sig - Route: Take 1 tablet by mouth Every Morning. - Oral    sodium chloride 0.9 % flush 1-10 mL 1-10 mL As Needed 3/22/2017     Sig - Route: Infuse 1-10 mL into a venous catheter As Needed for Line Care. - Intravenous    sodium chloride 0.9 % flush 10 mL 10 mL As Needed 3/22/2017     Sig - Route: Infuse 10 mL into a venous catheter As Needed for Line Care. - Intravenous    Linked Group 1:  \"And\" Linked Group Details        ramipril (ALTACE) capsule 10 mg (Discontinued) 10 mg Every 12 Hours Scheduled 3/22/2017 3/24/2017    Sig - Route: Take 2 capsules by mouth Every 12 (Twelve) Hours. - Oral             Physical Therapy Notes (last 72 hours) (Notes from 3/21/2017  2:23 PM through 3/24/2017  2:23 PM)      George Bedolla, PT DPT at 3/23/2017  4:04 PM  Version 1 of 1         Problem: Patient Care Overview (Adult)  Goal: Plan of Care Review  Outcome: Ongoing (interventions implemented as appropriate)    03/23/17 1602   Coping/Psychosocial Response Interventions   Plan Of Care Reviewed With patient   Outcome Evaluation   Outcome Summary/Follow up Plan PT eval complete. pt able to get OOB with supervision, stood CGA, ambulated CGA with  feet. pt would benefit from continued skilled PT to address impaired functional mobility, decreased activity tolerance, and decreased balance. Anticipate D/C home with family.          Problem: Inpatient Physical Therapy  Goal: Bed Mobility Goal LTG- PT  Outcome: Ongoing (interventions implemented as appropriate)    03/23/17 1602   Bed Mobility PT LTG   Bed Mobility PT LTG, Date Established 03/23/17   Bed Mobility PT LTG, Time to Achieve by discharge   Bed Mobility PT LTG, Activity Type all bed mobility   Bed Mobility PT LTG, [place] Level independent       Goal: Transfer Training Goal 1 LTG- PT  Outcome: Ongoing (interventions " implemented as appropriate)    17 1602   Transfer Training PT LTG   Transfer Training PT LTG, Date Established 17   Transfer Training PT LTG, Time to Achieve by discharge   Transfer Training PT LTG, Activity Type bed to chair /chair to bed;sit to stand/stand to sit   Transfer Training PT LTG, [place] Level conditional independence   Transfer Training PT LTG, Assist Device walker, rolling       Goal: Gait Training Goal LTG- PT  Outcome: Ongoing (interventions implemented as appropriate)    17 1602   Gait Training PT LTG   Gait Training Goal PT LTG, Date Established 17   Gait Training Goal PT LTG, Time to Achieve by discharge   Gait Training Goal PT LTG, [place] Level conditional independence   Gait Training Goal PT LTG, Assist Device walker, rolling   Gait Training Goal PT LTG, Distance to Achieve 350              Electronically signed by George Bedolla, PT DPT at 3/23/2017  4:04 PM      George Bedolla, PT DPT at 3/23/2017  4:06 PM  Version 1 of 1         Acute Care - Physical Therapy Initial Evaluation  [facility]     Patient Name: Nicolas Roberts  : 1945  MRN: [id number]  Today's Date: 3/23/2017   Onset of Illness/Injury or Date of Surgery Date: 17  Date of Referral to PT: 17  Referring Physician: Bernardo MANCILLA      Admit Date: 3/22/2017     Visit Dx:    ICD-10-CM ICD-9-CM   1. Chronic obstructive pulmonary disease with acute exacerbation J44.1 491.21     Patient Active Problem List   Diagnosis   • CAD (coronary artery disease)   • Carotid atherosclerosis   • Hypertension   • Chronic diastolic congestive heart failure   • Simple chronic bronchitis   • Chronic obstructive pulmonary disease with acute exacerbation     Past Medical History:   Diagnosis Date   • CAD (coronary artery disease)    • CAD in native artery    • Cardiac device in situ      in stent restenosis 2/10 RCA   • Carotid atherosclerosis    • Carotid bruit    • Diabetes mellitus     DM,  UNCOMPLICATED, TYPE II, UNCONTROLLED   • History of PTCA    • Hyperlipidemia    • Hypertension    • Pacemaker     does not have  a pacemaker    • Peripheral vascular disease      Past Surgical History:   Procedure Laterality Date   • ARTERIOVENOUS FISTULA     • CARDIAC CATHETERIZATION Left 02/18/2010    Stent to RCA   • CAROTID STENT      RCA          PT ASSESSMENT (last 72 hours)      PT Evaluation       03/23/17 1434 03/23/17 1017    Rehab Evaluation    Document Type evaluation   see MAR  -PB     Subjective Information agree to therapy;complains of;weakness;fatigue;dyspnea  -PB     Patient Effort, Rehab Treatment good  -PB     General Information    Patient Profile Review yes  -PB     Onset of Illness/Injury or Date of Surgery Date 03/22/17  -PB     Referring Physician Bernardo Alfonso APRN  -PB     General Observations awake and alert in bed on 2L O2/NC  -PB     Pertinent History Of Current Problem fatigue, malaise, general weakness, poor appetite, weight loss, SOB, cough x1 week; COPD exacerbation  -PB     Precautions/Limitations fall precautions;oxygen therapy device and L/min  -PB     Prior Level of Function independent:;all household mobility;ADL's  -PB     Equipment Currently Used at Home oxygen;walker, rolling;commode;grab bar;wheelchair;shower chair   using rollator PTA; not using BSC  -PB (P)  grab bar;commode  -PH    Plans/Goals Discussed With patient and family;agreed upon  -PB     Risks Reviewed patient and family:;nausea/vomiting;LOB;dizziness;increased discomfort  -PB     Benefits Reviewed patient and family:;improve function;increase independence;increase strength;increase balance  -PB     Barriers to Rehab previous functional deficit;medically complex;physical barrier;hearing deficit  -PB     Living Environment    Lives With spouse  -PB     Living Arrangements house  -PB     Home Accessibility grab bars present (bathtub);stairs to enter home;ramps present at home   walk in shower  -PB     Number of  Stairs to Enter Home 1   ramp onto deck then 4inch step to inside  -PB     Clinical Impression    Date of Referral to PT 03/23/17  -PB     PT Diagnosis impaired gait  -PB     Patient/Family Goals Statement return home  -PB     Criteria for Skilled Therapeutic Interventions Met yes;treatment indicated  -PB     Pathology/Pathophysiology Noted (Describe Specifically for Each System) musculoskeletal  -PB     Impairments Found (describe specific impairments) aerobic capacity/endurance;gait, locomotion, and balance  -PB     Functional Limitations in Following Categories (Describe Specific Limitations) self-care  -PB     Disability: Inability to Perform Actions/Activities of Required Roles (describe specific disability) community/leisure  -PB     Rehab Potential good, to achieve stated therapy goals  -PB     Predicted Duration of Therapy Intervention (days/wks) until D/C  -PB     Vital Signs    Pre SpO2 (%) 100  -PB     O2 Delivery Pre Treatment supplemental O2   2 LO2/NC  -PB     Intra SpO2 (%) 90  -PB     O2 Delivery Intra Treatment supplemental O2  -PB     Post SpO2 (%) 100  -PB     O2 Delivery Post Treatment supplemental O2  -PB     Pre Patient Position Supine  -PB     Intra Patient Position Standing  -PB     Post Patient Position Sitting  -PB     Pain Assessment    Pain Assessment No/denies pain  -PB     Cognitive Assessment/Intervention    Current Cognitive/Communication Assessment functional  -PB     Orientation Status oriented x 4  -PB     Follows Commands/Answers Questions 100% of the time;able to follow multi-step instructions  -PB     Personal Safety WNL/WFL  -PB     Personal Safety Interventions fall prevention program maintained;gait belt;nonskid shoes/slippers when out of bed;supervised activity  -PB     ROM (Range of Motion)    General ROM no range of motion deficits identified  -PB     MMT (Manual Muscle Testing)    General MMT Assessment Detail functionally 4+/5 all 4 extremities  -PB     Bed Mobility,  Assessment/Treatment    Bed Mobility, Assistive Device head of bed elevated  -PB     Bed Mob, Supine to Sit, [place] supervision required  -PB     Bed Mob, Sit to Supine, [place] not tested   up in chair  -PB     Transfer Assessment/Treatment    Transfers, Sit-Stand [place] contact guard assist  -PB     Transfers, Stand-Sit [place] contact guard assist  -PB     Transfers, Sit-Stand-Sit, Assist Device rolling walker  -PB     Transfer, Safety Issues step length decreased  -PB     Transfer, Impairments impaired balance  -PB     Gait Assessment/Treatment    Gait, [place] Level contact guard assist  -PB     Gait, Assistive Device rolling walker  -PB     Gait, Distance (Feet) 250  -PB     Gait, Gait Pattern Analysis swing-through gait  -PB     Gait, Gait Deviations amada decreased;forward flexed posture;step length decreased  -PB     Gait, Safety Issues step length decreased;supplemental O2  -PB     Gait, Impairments impaired balance  -PB     Motor Skills/Interventions    Additional Documentation Balance Skills Training (Group)  -PB     Balance Skills Training    Sitting-Level of Assistance Distant supervision  -PB     Sitting-Balance Support Feet supported  -PB     Standing-Level of Assistance Contact guard  -PB     Static Standing Balance Support assistive device  -PB     Gait Balance-Level of Assistance Contact guard  -PB     Gait Balance Support assistive device  -PB     Positioning and Restraints    Pre-Treatment Position in bed  -PB     Post Treatment Position chair  -PB     In Chair sitting;call light within reach;encouraged to call for assist;with family/caregiver  -PB       03/22/17 2052 03/22/17 2047    General Information    Equipment Currently Used at Home  oxygen;walker, standard  -    Living Environment    Lives With spouse  -     Living Arrangements house  -     Home Accessibility no concerns  -     Stair Railings at Home none  -     Type of Financial/Environmental  Concern none  -     Transportation Available car  -       User Key  (r) = Recorded By, (t) = Taken By, (c) = Cosigned By    Initials Name Provider Type     EPHRAIM Soriano, RN Registered Nurse    PB George Bedolla, PT DPT Physical Therapist    CJ Archer           Physical Therapy Education     Title: PT OT SLP Therapies (Active)     Topic: Physical Therapy (Active)     Point: Mobility training (Active)    Learning Progress Summary    Learner Readiness Method Response Comment Documented by Status   Patient Acceptance E NR benefits of therapy, safety concerns  03/23/17 1604 Active                      User Key     Initials Effective Dates Name Provider Type Discipline     08/02/16 -  George Bedolla, PT DPT Physical Therapist PT                PT Recommendation and Plan  Anticipated Discharge Disposition: home with assist  Planned Therapy Interventions: balance training, bed mobility training, gait training, home exercise program, patient/family education, strengthening, transfer training  PT Frequency: daily, 2 times/day, per priority policy  Plan of Care Review  Plan Of Care Reviewed With: patient  Outcome Summary/Follow up Plan: PT eval complete. pt able to get OOB with supervision, stood CGA, ambulated CGA with  feet. pt would benefit from continued skilled PT to address impaired functional mobility, decreased activity tolerance, and decreased balance. Anticipate D/C home with family.           IP PT Goals       03/23/17 1602          Bed Mobility PT LTG    Bed Mobility PT LTG, Date Established 03/23/17  -PB      Bed Mobility PT LTG, Time to Achieve by discharge  -PB      Bed Mobility PT LTG, Activity Type all bed mobility  -PB      Bed Mobility PT LTG, [place] Level independent  -PB      Transfer Training PT LTG    Transfer Training PT LTG, Date Established 03/23/17  -PB      Transfer Training PT LTG, Time to Achieve by discharge  -PB      Transfer Training PT LTG,  Activity Type bed to chair /chair to bed;sit to stand/stand to sit  -PB      Transfer Training PT LTG, [place] Level conditional independence  -PB      Transfer Training PT LTG, Assist Device walker, rolling  -PB      Gait Training PT LTG    Gait Training Goal PT LTG, Date Established 03/23/17  -PB      Gait Training Goal PT LTG, Time to Achieve by discharge  -PB      Gait Training Goal PT LTG, [place] Level conditional independence  -PB      Gait Training Goal PT LTG, Assist Device walker, rolling  -PB      Gait Training Goal PT LTG, Distance to Achieve 350  -PB        User Key  (r) = Recorded By, (t) = Taken By, (c) = Cosigned By    Initials Name Provider Type    YUNG Bedolla, PT DPT Physical Therapist                Outcome Measures       03/23/17 1600          How much help from another person do you currently need...    Turning from your back to your side while in flat bed without using bedrails? 3  -PB      Moving from lying on back to sitting on the side of a flat bed without bedrails? 3  -PB      Moving to and from a bed to a chair (including a wheelchair)? 3  -PB      Standing up from a chair using your arms (e.g., wheelchair, bedside chair)? 3  -PB      Climbing 3-5 steps with a railing? 3  -PB      To walk in hospital room? 3  -PB      AM-PAC 6 Clicks Score 18  -PB      Functional Assessment    Outcome Measure Options AM-PAC 6 Clicks Basic Mobility (PT)  -PB        User Key  (r) = Recorded By, (t) = Taken By, (c) = Cosigned By    Initials Name Provider Type    YUNG Bedolla PT DPT Physical Therapist           Time Calculation:         PT Charges       03/23/17 1605          Time Calculation    Start Time 1434  -PB      Stop Time 1506  -PB      Time Calculation (min) 32 min  -PB      PT Received On 03/23/17  -PB      PT Goal Re-Cert Due Date 04/02/17  -PB        User Key  (r) = Recorded By, (t) = Taken By, (c) = Cosigned By    Initials Name Provider Type    YUNG Bedolla PT  DPT Physical Therapist          Therapy Charges for Today     Code Description Service Date Service Provider Modifiers Qty    14020661960 HC PT MOBILITY CURRENT 3/23/2017 George Bedolla, PT DPT GP, CK 1    04150091429 HC PT MOBILITY PROJECTED 3/23/2017 George Bedolla, PT DPT GP, CJ 1    11893852246 HC PT EVAL LOW COMPLEXITY 2 3/23/2017 George Bedolla, PT DPT GP 1          PT G-Codes  Outcome Measure Options: AM-PAC 6 Clicks Basic Mobility (PT)  Score: 18  Functional Limitation: Mobility: Walking and moving around  Mobility: Walking and Moving Around Current Status (): At least 40 percent but less than 60 percent impaired, limited or restricted  Mobility: Walking and Moving Around Goal Status (): At least 20 percent but less than 40 percent impaired, limited or restricted      George Bedolla, PT DPT  3/23/2017          Electronically signed by George Bedolla PT DPT at 3/23/2017  4:06 PM        Occupational Therapy Notes (last 72 hours) (Notes from 3/21/2017  2:23 PM through 3/24/2017  2:23 PM)     No notes of this type exist for this encounter.

## 2017-03-24 NOTE — PROGRESS NOTES
Nephrology ([facility]) Consult Note      Patient:  Nicolas Roberts  YOB: 1945  Date of Service: 3/24/2017  MRN: [id number]   Acct: [id number]   Primary Care Physician: Rafita Harris MD  Advance Directive: Full Code  Admit Date: 3/22/2017       Hospital Day: 2  Referring Provider: Pato Alfred MD      Patient Seen, Chart, Consults, Notes, Labs, Radiology studies reviewed.        Subjective:  Nicolas Roberts is a 72 y.o. male  whom we were consulted for end stage renal disease management. Hemodialysis Monday Wednesday Friday. Patient admitted for progressive weakness and exacerbation of COPD. No N/V/D.  No dyspnea at rest but has significant shortness of breath with any exertion.  Today is seen on dialysis; tolerating well.    Allergies:  Contrast dye; Promethazine hcl; and Hydrocodone-acetaminophen    Home Meds:  Prescriptions Prior to Admission   Medication Sig Dispense Refill Last Dose   • albuterol (PROVENTIL HFA;VENTOLIN HFA) 108 (90 BASE) MCG/ACT inhaler Inhale 1 puff Every 4 (Four) Hours As Needed for Wheezing.   3/22/2017 at 0800   • albuterol (PROVENTIL) (2.5 MG/3ML) 0.083% nebulizer solution Take 2.5 mg by nebulization Every 6 (Six) Hours As Needed for Wheezing or Shortness of Air.   3/22/2017 at 0800   • allopurinol (ZYLOPRIM) 100 MG tablet Take 100 mg by mouth 2 (Two) Times a Day.   3/22/2017 at 0800   • amLODIPine (NORVASC) 5 MG tablet Take 5 mg by mouth 2 (Two) Times a Day.   3/21/2017 at 0800   • atorvastatin (LIPITOR) 80 MG tablet Take 80 mg by mouth Every Night.   3/21/2017 at 2000   • B Complex-C-Folic Acid (JABIER-ARIS PO) Take 1 tablet by mouth Daily.   Past Week at Unknown time   • clopidogrel (PLAVIX) 75 MG tablet Take 75 mg by mouth Daily.   3/22/2017 at 0800   • Ferric Citrate (AURYXIA) 1  MG(FE) tablet Take 1 tablet by mouth Every Night.   3/21/2017 at 2000   • Insulin Glargine (LANTUS SOLOSTAR) 100 UNIT/ML injection pen Inject 10 Units  under the skin Every Night.   3/20/2017 at 2000   • metoprolol tartrate (LOPRESSOR) 100 MG tablet Take 100 mg by mouth Daily.   3/22/2017 at 0800   • nitroglycerin (NITROSTAT) 0.4 MG SL tablet Place 0.4 mg under the tongue Every 5 (Five) Minutes As Needed for chest pain. Take no more than 3 doses in 15 minutes.   Past Month at Unknown time   • omeprazole (PriLOSEC) 20 MG capsule Take 20 mg by mouth 2 (Two) Times a Day.   3/22/2017 at 0800   • ramipril (ALTACE) 10 MG capsule Take 10 mg by mouth 2 (Two) Times a Day.   3/22/2017 at 0800       Medicines:  Current Facility-Administered Medications   Medication Dose Route Frequency Provider Last Rate Last Dose   • acetaminophen (TYLENOL) tablet 650 mg  650 mg Oral Q6H PRN Pato Alfred MD       • amLODIPine (NORVASC) tablet 5 mg  5 mg Oral Q12H Pato Alfred MD   5 mg at 03/23/17 2038   • atorvastatin (LIPITOR) tablet 80 mg  80 mg Oral Nightly Pato Alfred MD   80 mg at 03/23/17 2040   • CloNIDine (CATAPRES) tablet 0.1 mg  0.1 mg Oral Q6H PRN Pato Alfred MD       • clopidogrel (PLAVIX) tablet 75 mg  75 mg Oral Daily Pato Alfred MD   75 mg at 03/23/17 0838   • dextrose (D50W) solution 25 g  25 g Intravenous Q15 Min PRN Pato Alfred MD       • dextrose (GLUTOSE) oral gel 15 g  15 g Oral Q15 Min PRN Pato Alfred MD       • diazePAM (VALIUM) tablet 2 mg  2 mg Oral Q8H PRN Ruth Norman MD   2 mg at 03/24/17 0243   • glucagon (human recombinant) (GLUCAGEN DIAGNOSTIC) injection 1 mg  1 mg Subcutaneous Q15 Min PRN Pato Alfred MD       • heparin (porcine) 5000 UNIT/ML injection 5,000 Units  5,000 Units Subcutaneous Q12H Pato Alfred MD   5,000 Units at 03/23/17 2040   • insulin detemir (LEVEMIR) injection 10 Units  10 Units Subcutaneous Nightly Pato Alfred MD   10 Units at 03/23/17 2038   • insulin lispro (humaLOG) injection 0-9 Units  0-9 Units Subcutaneous 4x Daily AC & at Bedtime Pato Alfred MD   6 Units at 03/23/17 2037   • ipratropium-albuterol (DUO-NEB)  nebulizer solution 3 mL  3 mL Nebulization Q4H PRN Pato Alfred MD       • levoFLOXacin (LEVAQUIN) 250 mg/50 mL D5W (premix) 250 mg  250 mg Intravenous Q48H MARIA LUISA Wooten [facility]       • methylPREDNISolone sodium succinate (SOLU-Medrol) injection 60 mg  60 mg Intravenous Q12H Pato Alfred MD   60 mg at 03/24/17 0639   • metoprolol tartrate (LOPRESSOR) tablet 50 mg  50 mg Oral Q12H Pato Alfred MD   50 mg at 03/23/17 2038   • nitroglycerin (NITROSTAT) SL tablet 0.4 mg  0.4 mg Sublingual Q5 Min PRN Pato Alfred MD       • ondansetron (ZOFRAN) injection 4 mg  4 mg Intravenous Q6H PRN Pato Alfred MD       • pantoprazole (PROTONIX) EC tablet 40 mg  40 mg Oral Q AM Pato Alfred MD   40 mg at 03/24/17 0639   • ramipril (ALTACE) capsule 10 mg  10 mg Oral Q12H Pato Alfred MD   10 mg at 03/23/17 0837   • sodium chloride 0.9 % flush 1-10 mL  1-10 mL Intravenous PRN Pato Alfred MD       • sodium chloride 0.9 % flush 10 mL  10 mL Intravenous PRN Zackary Dahl Jr., MD           Past Medical History:  Past Medical History:   Diagnosis Date   • CAD (coronary artery disease)    • CAD in native artery    • Cardiac device in situ      in stent restenosis 2/10 RCA   • Carotid atherosclerosis    • Carotid bruit    • Diabetes mellitus     DM, UNCOMPLICATED, TYPE II, UNCONTROLLED   • History of PTCA    • Hyperlipidemia    • Hypertension    • Pacemaker     does not have  a pacemaker    • Peripheral vascular disease        Past Surgical History:  Past Surgical History:   Procedure Laterality Date   • ARTERIOVENOUS FISTULA     • CARDIAC CATHETERIZATION Left 02/18/2010    Stent to RCA   • CAROTID STENT      RCA       Family History  Family History   Problem Relation Age of Onset   • Heart attack Father        Social History  Social History     Social History   • Marital status:      Spouse name: N/A   • Number of children: N/A   • Years of education: N/A     Occupational History   • Not on file.     Social History Main Topics  "  • Smoking status: Former Smoker   • Smokeless tobacco: Not on file      Comment: quit 30 years ago    • Alcohol use 1.2 oz/week     2 Standard drinks or equivalent per week   • Drug use: No   • Sexual activity: Defer     Other Topics Concern   • Not on file     Social History Narrative         Review of Systems:  History obtained from chart review and the patient  General ROS: No fever or chills  Respiratory ROS: No cough, shortness of breath, wheezing  Cardiovascular ROS: positive for - dyspnea on exertion  Gastrointestinal ROS: No abdominal pain or melena  Genito-Urinary ROS: No dysuria or hematuria  14 point ROS reviewed with the patient and negative except as noted above and in the HPI unless unable to obtain.    Objective:  /53 (BP Location: Right arm, Patient Position: Lying)  Pulse 64  Temp 98.6 °F (37 °C) (Temporal Artery )   Resp 19  Ht 67\" (170.2 cm)  Wt 114 lb 12.8 oz (52.1 kg)  SpO2 100%  BMI 17.98 kg/m2    Intake/Output Summary (Last 24 hours) at 03/24/17 0848  Last data filed at 03/23/17 2002   Gross per 24 hour   Intake                0 ml   Output               75 ml   Net              -75 ml     General: awake/alert   Chest:  clear to auscultation bilaterally without respiratory distress  CVS: regular rate and rhythm  Abdominal: soft, nontender, normal bowel sounds  Extremities: no cyanosis or edema  Skin: warm and dry without rash      Labs:    Results from last 7 days  Lab Units 03/24/17  0602 03/23/17  0459 03/22/17  1738   WBC 10*3/mm3 13.71* 5.37 9.35   HEMOGLOBIN g/dL 10.2* 10.2* 11.5*   HEMATOCRIT % 31.4* 31.8* 35.7*   PLATELETS 10*3/mm3 221 201 206           Results from last 7 days  Lab Units 03/24/17  0602 03/23/17  0459 03/22/17  1738   SODIUM mmol/L 136 140 141   POTASSIUM mmol/L 4.8 4.2 3.8   CHLORIDE mmol/L 94* 94* 94*   TOTAL CO2 mmol/L 31.0 34.0* 37.0*   BUN mg/dL 65* 33* 18   CREATININE mg/dL 3.71* 2.63* 1.85*   CALCIUM mg/dL 9.3 9.1 9.5   BILIRUBIN mg/dL  --   --  " 0.6   ALK PHOS U/L  --   --  101   ALT (SGPT) U/L  --   --  48   AST (SGOT) U/L  --   --  51*   GLUCOSE mg/dL 136* 184* 137*       Radiology:   Imaging Results (last 72 hours)     Procedure Component Value Units Date/Time    XR Chest 1 View [[id number]] Collected:  03/22/17 1807     Updated:  03/1945    Narrative:       EXAMINATION:  XR CHEST 1 VW- 3/22/2017 5:21 PM CDT     HISTORY: Short of breath.     A single view of the chest is obtained. A right pleural effusion is  present. A small left pleural effusion is present.     A right perihilar interstitial infiltrate is present. This has the  appearance of pulmonary vascular congestion. A right internal jugular  central venous catheter is present, unchanged from 12/03/2016.       Impression:       Vascular congestion with cardiomegaly and bilateral pleural effusions.  This report was finalized on 03/22/2017 19:40 by Dr. Herson Coe MD.          Culture:  Blood Culture   Date Value Ref Range Status   03/22/2017 No growth at 24 hours  Preliminary   03/22/2017 No growth at 24 hours  Preliminary         Assessment   1. End stage renal disease on hemodialysis Monday Wednesday Friday  2. Acute exacerbation of chronic obstructive pulmonary disease  3. Chronic congestive heart failure  4. Anemia of chronic disease  5. Hypertension  6. Diabetes mellitus type 2    Plan:  1.  Dialysis this AM  2.  Continue to follow renal function and electrolytes      Rob Crowder, APRN  3/24/2017  8:48 AM

## 2017-03-24 NOTE — PLAN OF CARE
Problem: Patient Care Overview (Adult)  Goal: Plan of Care Review  Outcome: Ongoing (interventions implemented as appropriate)    03/24/17 0534   Coping/Psychosocial Response Interventions   Plan Of Care Reviewed With patient   Outcome Evaluation   Outcome Summary/Follow up Plan Patient supposed to go dialysis today, has RT. perma cath and LT. upper arm fistula. Patient on O2 2 NC with continuous pulse ox and BI- PAP with sleep patient not tolerating well. MD. Called and PRN medication for anxiety given, which seemed to help patient and enable him to keep BI-PAP on. No C/O pain. Safety maintained.         Problem: Fall Risk (Adult)  Goal: Identify Related Risk Factors and Signs and Symptoms  Outcome: Outcome(s) achieved Date Met:  03/24/17  Goal: Absence of Falls  Outcome: Ongoing (interventions implemented as appropriate)    Problem: Diabetes, Type 2 (Adult)  Goal: Signs and Symptoms of Listed Potential Problems Will be Absent or Manageable (Diabetes, Type 2)  Outcome: Ongoing (interventions implemented as appropriate)

## 2017-03-24 NOTE — PROGRESS NOTES
Continued Stay Note   [facility]     Patient Name: Nicolas Roberts  MRN: [id number]  Today's Date: 3/24/2017    Admit Date: 3/22/2017          Discharge Plan       03/24/17 1423    Case Management/Social Work Plan    Plan Possible SNF    Patient/Family In Agreement With Plan yes    Additional Comments Rec'd a referral that pt would benefit from rehab at d/c. Pt has been working with PT. After much discussion with him and his wife, they have requested a referral being sent to [facility]. Pt is getting his dialysis in [place]. Referral sent.               Discharge Codes     None            PUSHPA Child

## 2017-03-24 NOTE — PLAN OF CARE
Problem: Patient Care Overview (Adult)  Goal: Plan of Care Review    03/24/17 1721   Coping/Psychosocial Response Interventions   Plan Of Care Reviewed With patient   Patient Care Overview   Progress improving   Outcome Evaluation   Outcome Summary/Follow up Plan working with PT, bipap prn, stool regimen added, 2L removed at dialysis today, R permacath due to maturing fistula         Problem: COPD, Chronic Bronchitis/Emphysema (Adult)  Goal: Signs and Symptoms of Listed Potential Problems Will be Absent or Manageable (COPD, Chronic Bronchitis/Emphysema)  Outcome: Ongoing (interventions implemented as appropriate)    Problem: Fall Risk (Adult)  Goal: Absence of Falls  Outcome: Ongoing (interventions implemented as appropriate)    Problem: Diabetes, Type 2 (Adult)  Goal: Signs and Symptoms of Listed Potential Problems Will be Absent or Manageable (Diabetes, Type 2)  Outcome: Ongoing (interventions implemented as appropriate)

## 2017-03-25 ENCOUNTER — APPOINTMENT (OUTPATIENT)
Dept: GENERAL RADIOLOGY | Facility: HOSPITAL | Age: 72
End: 2017-03-25

## 2017-03-25 LAB
ANION GAP SERPL CALCULATED.3IONS-SCNC: 9 MMOL/L (ref 4–13)
BUN BLD-MCNC: 43 MG/DL (ref 5–21)
BUN/CREAT SERPL: 16.2 (ref 7–25)
CALCIUM SPEC-SCNC: 8.6 MG/DL (ref 8.4–10.4)
CHLORIDE SERPL-SCNC: 93 MMOL/L (ref 98–110)
CO2 SERPL-SCNC: 32 MMOL/L (ref 24–31)
CREAT BLD-MCNC: 2.66 MG/DL (ref 0.5–1.4)
DEPRECATED RDW RBC AUTO: 49.1 FL (ref 40–54)
ERYTHROCYTE [DISTWIDTH] IN BLOOD BY AUTOMATED COUNT: 14.1 % (ref 12–15)
GFR SERPL CREATININE-BSD FRML MDRD: 24 ML/MIN/1.73
GLUCOSE BLD-MCNC: 41 MG/DL (ref 70–100)
GLUCOSE BLDC GLUCOMTR-MCNC: 101 MG/DL (ref 70–130)
GLUCOSE BLDC GLUCOMTR-MCNC: 126 MG/DL (ref 70–130)
GLUCOSE BLDC GLUCOMTR-MCNC: 139 MG/DL (ref 70–130)
GLUCOSE BLDC GLUCOMTR-MCNC: 227 MG/DL (ref 70–130)
GLUCOSE BLDC GLUCOMTR-MCNC: 43 MG/DL (ref 70–130)
GLUCOSE BLDC GLUCOMTR-MCNC: 44 MG/DL (ref 70–130)
GLUCOSE BLDC GLUCOMTR-MCNC: 48 MG/DL (ref 70–130)
GLUCOSE BLDC GLUCOMTR-MCNC: 49 MG/DL (ref 70–130)
GLUCOSE BLDC GLUCOMTR-MCNC: 73 MG/DL (ref 70–130)
GLUCOSE BLDC GLUCOMTR-MCNC: 99 MG/DL (ref 70–130)
HCT VFR BLD AUTO: 30.3 % (ref 40–52)
HGB BLD-MCNC: 9.7 G/DL (ref 14–18)
MCH RBC QN AUTO: 30.6 PG (ref 28–32)
MCHC RBC AUTO-ENTMCNC: 32 G/DL (ref 33–36)
MCV RBC AUTO: 95.6 FL (ref 82–95)
PLATELET # BLD AUTO: 216 10*3/MM3 (ref 130–400)
PMV BLD AUTO: 9.8 FL (ref 6–12)
POTASSIUM BLD-SCNC: 4.2 MMOL/L (ref 3.5–5.3)
RBC # BLD AUTO: 3.17 10*6/MM3 (ref 4.8–5.9)
SODIUM BLD-SCNC: 134 MMOL/L (ref 135–145)
WBC NRBC COR # BLD: 10.81 10*3/MM3 (ref 4.8–10.8)

## 2017-03-25 PROCEDURE — 94799 UNLISTED PULMONARY SVC/PX: CPT

## 2017-03-25 PROCEDURE — 25010000002 HEPARIN (PORCINE) PER 1000 UNITS: Performed by: INTERNAL MEDICINE

## 2017-03-25 PROCEDURE — 82962 GLUCOSE BLOOD TEST: CPT

## 2017-03-25 PROCEDURE — 63710000001 PREDNISONE PER 1 MG: Performed by: INTERNAL MEDICINE

## 2017-03-25 PROCEDURE — 71020 HC CHEST PA AND LATERAL: CPT

## 2017-03-25 PROCEDURE — 80048 BASIC METABOLIC PNL TOTAL CA: CPT | Performed by: NURSE PRACTITIONER

## 2017-03-25 PROCEDURE — 94660 CPAP INITIATION&MGMT: CPT

## 2017-03-25 PROCEDURE — 97116 GAIT TRAINING THERAPY: CPT

## 2017-03-25 PROCEDURE — 85027 COMPLETE CBC AUTOMATED: CPT | Performed by: NURSE PRACTITIONER

## 2017-03-25 RX ADMIN — DOCUSATE SODIUM 100 MG: 100 CAPSULE ORAL at 09:50

## 2017-03-25 RX ADMIN — METOPROLOL TARTRATE 50 MG: 50 TABLET ORAL at 21:53

## 2017-03-25 RX ADMIN — INSULIN LISPRO 4 UNITS: 100 INJECTION, SOLUTION INTRAVENOUS; SUBCUTANEOUS at 21:54

## 2017-03-25 RX ADMIN — HEPARIN SODIUM 5000 UNITS: 5000 INJECTION, SOLUTION INTRAVENOUS; SUBCUTANEOUS at 21:52

## 2017-03-25 RX ADMIN — BUDESONIDE AND FORMOTEROL FUMARATE DIHYDRATE 2 PUFF: 160; 4.5 AEROSOL RESPIRATORY (INHALATION) at 07:05

## 2017-03-25 RX ADMIN — AMLODIPINE BESYLATE 5 MG: 5 TABLET ORAL at 21:53

## 2017-03-25 RX ADMIN — CLOPIDOGREL BISULFATE 75 MG: 75 TABLET, FILM COATED ORAL at 09:50

## 2017-03-25 RX ADMIN — DEXTROSE MONOHYDRATE 20 G: 25 INJECTION, SOLUTION INTRAVENOUS at 06:56

## 2017-03-25 RX ADMIN — METOPROLOL TARTRATE 50 MG: 50 TABLET ORAL at 09:50

## 2017-03-25 RX ADMIN — BUDESONIDE AND FORMOTEROL FUMARATE DIHYDRATE 2 PUFF: 160; 4.5 AEROSOL RESPIRATORY (INHALATION) at 19:18

## 2017-03-25 RX ADMIN — PANTOPRAZOLE SODIUM 40 MG: 40 TABLET, DELAYED RELEASE ORAL at 06:41

## 2017-03-25 RX ADMIN — DESMOPRESSIN ACETATE 80 MG: 0.2 TABLET ORAL at 21:52

## 2017-03-25 RX ADMIN — DOCUSATE SODIUM 100 MG: 100 CAPSULE ORAL at 17:48

## 2017-03-25 RX ADMIN — AMLODIPINE BESYLATE 5 MG: 5 TABLET ORAL at 09:50

## 2017-03-25 RX ADMIN — HEPARIN SODIUM 5000 UNITS: 5000 INJECTION, SOLUTION INTRAVENOUS; SUBCUTANEOUS at 09:50

## 2017-03-25 RX ADMIN — PREDNISONE 20 MG: 20 TABLET ORAL at 09:50

## 2017-03-25 NOTE — PROGRESS NOTES
Nephrology ([facility]) Consult Note      Patient:  Nicolas Roberts  YOB: 1945  Date of Service: 3/25/2017  MRN: [id number]   Acct: [id number]   Primary Care Physician: Rafita Harris MD  Advance Directive: Full Code  Admit Date: 3/22/2017       Hospital Day: 3  Referring Provider: Pato Alfred MD      Patient Seen, Chart, Consults, Notes, Labs, Radiology studies reviewed.        Subjective:  Nicolas Roberts is a 72 y.o. male  whom we were consulted for end stage renal disease management. Hemodialysis Monday Wednesday Friday. Patient admitted for progressive weakness and exacerbation of COPD. No N/V/D. No dyspnea at rest but has had significant shortness of breath with any exertion.     Feeling well today.  Issues with hypoglycemia overnight and AM of 3/25.         Allergies:  Contrast dye; Promethazine hcl; and Hydrocodone-acetaminophen    Home Meds:  Prescriptions Prior to Admission   Medication Sig Dispense Refill Last Dose   • albuterol (PROVENTIL HFA;VENTOLIN HFA) 108 (90 BASE) MCG/ACT inhaler Inhale 1 puff Every 4 (Four) Hours As Needed for Wheezing.   3/22/2017 at 0800   • albuterol (PROVENTIL) (2.5 MG/3ML) 0.083% nebulizer solution Take 2.5 mg by nebulization Every 6 (Six) Hours As Needed for Wheezing or Shortness of Air.   3/22/2017 at 0800   • allopurinol (ZYLOPRIM) 100 MG tablet Take 100 mg by mouth 2 (Two) Times a Day.   3/22/2017 at 0800   • amLODIPine (NORVASC) 5 MG tablet Take 5 mg by mouth 2 (Two) Times a Day.   3/21/2017 at 0800   • atorvastatin (LIPITOR) 80 MG tablet Take 80 mg by mouth Every Night.   3/21/2017 at 2000   • B Complex-C-Folic Acid (JABIER-ARIS PO) Take 1 tablet by mouth Daily.   Past Week at Unknown time   • clopidogrel (PLAVIX) 75 MG tablet Take 75 mg by mouth Daily.   3/22/2017 at 0800   • Ferric Citrate (AURYXIA) 1  MG(FE) tablet Take 1 tablet by mouth Every Night.   3/21/2017 at 2000   • Insulin Glargine (LANTUS SOLOSTAR) 100  UNIT/ML injection pen Inject 10 Units under the skin Every Night.   3/20/2017 at 2000   • metoprolol tartrate (LOPRESSOR) 100 MG tablet Take 100 mg by mouth Daily.   3/22/2017 at 0800   • nitroglycerin (NITROSTAT) 0.4 MG SL tablet Place 0.4 mg under the tongue Every 5 (Five) Minutes As Needed for chest pain. Take no more than 3 doses in 15 minutes.   Past Month at Unknown time   • omeprazole (PriLOSEC) 20 MG capsule Take 20 mg by mouth 2 (Two) Times a Day.   3/22/2017 at 0800   • ramipril (ALTACE) 10 MG capsule Take 10 mg by mouth 2 (Two) Times a Day.   3/22/2017 at 0800       Medicines:  Current Facility-Administered Medications   Medication Dose Route Frequency Provider Last Rate Last Dose   • acetaminophen (TYLENOL) tablet 650 mg  650 mg Oral Q6H PRN Pato Alfred MD       • amLODIPine (NORVASC) tablet 5 mg  5 mg Oral Q12H Pato Alfred MD   5 mg at 03/25/17 0950   • atorvastatin (LIPITOR) tablet 80 mg  80 mg Oral Nightly Pato Alfred MD   80 mg at 03/24/17 2107   • budesonide-formoterol (SYMBICORT) 160-4.5 MCG/ACT inhaler 2 puff  2 puff Inhalation BID - RT Tiburcio Maguire MD   2 puff at 03/25/17 0705   • CloNIDine (CATAPRES) tablet 0.1 mg  0.1 mg Oral Q6H PRN Pato Alfred MD       • clopidogrel (PLAVIX) tablet 75 mg  75 mg Oral Daily Pato Alfred MD   75 mg at 03/25/17 0950   • dextrose (D50W) solution 25 g  25 g Intravenous Q15 Min PRN Pato Alfred MD   20 g at 03/25/17 0656   • dextrose (GLUTOSE) oral gel 15 g  15 g Oral Q15 Min PRN Pato Alfred MD       • diazePAM (VALIUM) tablet 2 mg  2 mg Oral Q8H PRN Ruth Norman MD   2 mg at 03/24/17 2237   • docusate sodium (COLACE) capsule 100 mg  100 mg Oral BID CHARO Zayas   100 mg at 03/25/17 1748   • glucagon (human recombinant) (GLUCAGEN DIAGNOSTIC) injection 1 mg  1 mg Subcutaneous Q15 Min PRN Pato Alfred MD       • heparin (porcine) 5000 UNIT/ML injection 5,000 Units  5,000 Units Subcutaneous Q12H Pato Alfred MD   5,000 Units at 03/25/17  0950   • heparin (porcine) injection 1,800 Units  1,800 Units Intracatheter PRN Blaine Phipps MD   1,800 Units at 03/24/17 1231   • heparin (porcine) injection 1,800 Units  1,800 Units Intracatheter PRN Blaine Phipps MD   1,800 Units at 03/24/17 1230   • insulin lispro (humaLOG) injection 0-9 Units  0-9 Units Subcutaneous 4x Daily AC & at Bedtime Pato Alfred MD   4 Units at 03/24/17 2108   • ipratropium-albuterol (DUO-NEB) nebulizer solution 3 mL  3 mL Nebulization Q4H PRN Pato Alfred MD       • levoFLOXacin (LEVAQUIN) tablet 250 mg  250 mg Oral Every Other Day Tiburcio Maguire MD   250 mg at 03/24/17 1757   • metoprolol tartrate (LOPRESSOR) tablet 50 mg  50 mg Oral Q12H Pato Alfred MD   50 mg at 03/25/17 0950   • nitroglycerin (NITROSTAT) SL tablet 0.4 mg  0.4 mg Sublingual Q5 Min PRN Pato Alfred MD       • ondansetron (ZOFRAN) injection 4 mg  4 mg Intravenous Q6H PRN Pato Alfred MD       • pantoprazole (PROTONIX) EC tablet 40 mg  40 mg Oral Q AM Pato Aflred MD   40 mg at 03/25/17 0641   • predniSONE (DELTASONE) tablet 20 mg  20 mg Oral Daily With Breakfast Tiburcio Maguire MD   20 mg at 03/25/17 0950   • sodium chloride 0.9 % flush 1-10 mL  1-10 mL Intravenous PRN Pato Alfred MD       • sodium chloride 0.9 % flush 10 mL  10 mL Intravenous PRN Zackary Dahl Jr., MD           Past Medical History:  Past Medical History:   Diagnosis Date   • CAD (coronary artery disease)    • CAD in native artery    • Cardiac device in situ      in stent restenosis 2/10 RCA   • Carotid atherosclerosis    • Carotid bruit    • Diabetes mellitus     DM, UNCOMPLICATED, TYPE II, UNCONTROLLED   • History of PTCA    • Hyperlipidemia    • Hypertension    • Pacemaker     does not have  a pacemaker    • Peripheral vascular disease        Past Surgical History:  Past Surgical History:   Procedure Laterality Date   • ARTERIOVENOUS FISTULA     • CARDIAC CATHETERIZATION Left 02/18/2010    Stent to  "RCA   • CAROTID STENT      RCA       Family History  Family History   Problem Relation Age of Onset   • Heart attack Father        Social History  Social History     Social History   • Marital status:      Spouse name: N/A   • Number of children: N/A   • Years of education: N/A     Occupational History   • Not on file.     Social History Main Topics   • Smoking status: Former Smoker   • Smokeless tobacco: Not on file      Comment: quit 30 years ago    • Alcohol use 1.2 oz/week     2 Standard drinks or equivalent per week   • Drug use: No   • Sexual activity: Defer     Other Topics Concern   • Not on file     Social History Narrative         Review of Systems:  History obtained from chart review and the patient  General ROS: No fever or chills  Respiratory ROS: No cough, +shortness of breath  Cardiovascular ROS: no chest pain or dyspnea on exertion  Gastrointestinal ROS: No abdominal pain or melena  Genito-Urinary ROS: No dysuria or hematuria  14 point ROS reviewed with the patient and negative except as noted above and in the HPI unless unable to obtain.    Objective:  /52 (BP Location: Right arm, Patient Position: Lying)  Pulse 57  Temp 97.3 °F (36.3 °C) (Tympanic)   Resp 18  Ht 67\" (170.2 cm)  Wt 115 lb 6.4 oz (52.3 kg)  SpO2 95%  BMI 18.07 kg/m2    Intake/Output Summary (Last 24 hours) at 03/25/17 1757  Last data filed at 03/25/17 1300   Gross per 24 hour   Intake              720 ml   Output                0 ml   Net              720 ml     General: awake/alert   Chest:  Long exp phase otherwise clear  CVS: regular rate and rhythm  Abdominal: soft, nontender, normal bowel sounds  Extremities: no cyanosis or edema  Skin: warm and dry without rash      Labs:    Results from last 7 days  Lab Units 03/25/17  0526 03/24/17  0602 03/23/17  0459   WBC 10*3/mm3 10.81* 13.71* 5.37   HEMOGLOBIN g/dL 9.7* 10.2* 10.2*   HEMATOCRIT % 30.3* 31.4* 31.8*   PLATELETS 10*3/mm3 216 221 201           Results " from last 7 days  Lab Units 03/25/17  0526 03/24/17  0602 03/23/17  0459 03/22/17  1738   SODIUM mmol/L 134* 136 140 141   POTASSIUM mmol/L 4.2 4.8 4.2 3.8   CHLORIDE mmol/L 93* 94* 94* 94*   TOTAL CO2 mmol/L 32.0* 31.0 34.0* 37.0*   BUN mg/dL 43* 65* 33* 18   CREATININE mg/dL 2.66* 3.71* 2.63* 1.85*   CALCIUM mg/dL 8.6 9.3 9.1 9.5   BILIRUBIN mg/dL  --   --   --  0.6   ALK PHOS U/L  --   --   --  101   ALT (SGPT) U/L  --   --   --  48   AST (SGOT) U/L  --   --   --  51*   GLUCOSE mg/dL 41* 136* 184* 137*       Radiology:   Imaging Results (last 72 hours)     Procedure Component Value Units Date/Time    XR Chest 1 View [[id number]] Collected:  03/22/17 1807     Updated:  03/1945    Narrative:       EXAMINATION:  XR CHEST 1 VW- 3/22/2017 5:21 PM CDT     HISTORY: Short of breath.     A single view of the chest is obtained. A right pleural effusion is  present. A small left pleural effusion is present.     A right perihilar interstitial infiltrate is present. This has the  appearance of pulmonary vascular congestion. A right internal jugular  central venous catheter is present, unchanged from 12/03/2016.       Impression:       Vascular congestion with cardiomegaly and bilateral pleural effusions.  This report was finalized on 03/22/2017 19:40 by Dr. Herson Coe MD.    XR Chest PA & Lateral [[id number]] Collected:  03/25/17 0942     Updated:  03/25/17 1612    Narrative:       Frontal supine radiograph of the chest 3/25/2017 7:39 CST     History: dyspnea; J44.1-Chronic obstructive pulmonary disease with  (acute) exacerbation     Comparison: Chest x-ray dated 03/22/2017      Findings:   Lines and tubes are stable in position. No new opacities or  pneumothoraces are visualized in the chest. The cardiomediastinal  silhouette and pulmonary vascularity are unchanged.       No acute osseous or soft tissue abnormality is noted.        Impression:       Impression:    No significant interval change since previous exam  including the  bilateral pleural effusions and associated opacity.        This report was finalized on 03/25/2017 16:09 by Dr. Gabriella Jacob MD.          Culture:  Blood Culture   Date Value Ref Range Status   03/22/2017 No growth at 2 days  Preliminary   03/22/2017 No growth at 2 days  Preliminary         Assessment   1. End stage renal disease on hemodialysis Monday Wednesday Friday  2. Acute exacerbation of chronic obstructive pulmonary disease  3. Chronic congestive heart failure  4. Anemia of chronic disease  5. Hypertension  6. Diabetes mellitus type 2      Plan:  HD MWF  Monitor labs  Insulin adjusted for hypoglycemia  Planning [facility]    Blaine Phipps MD  3/25/2017  5:57 PM

## 2017-03-25 NOTE — PLAN OF CARE
Problem: Patient Care Overview (Adult)  Goal: Plan of Care Review  Outcome: Ongoing (interventions implemented as appropriate)    03/25/17 1502   Coping/Psychosocial Response Interventions   Plan Of Care Reviewed With patient   Patient Care Overview   Progress progress towards functional goals is fair   Outcome Evaluation   Outcome Summary/Follow up Plan Pt transfered supine to sit with SBA. Sit to stand with CGA.. Amb 150' with RWX on 2.0 L O2 CGA, X 2 with one standing rest. Will continue to work with pt to increase strength and endurance

## 2017-03-25 NOTE — PROGRESS NOTES
[facility] Medicine Services  INPATIENT PROGRESS NOTE    Length of Stay: 3  Date of Admission: 3/22/2017  Primary Care Physician: Rafita Harris MD    Subjective   Chief Complaint: follow up soa    HPI   The patient is currently sitting up in bed.  He states his breathing is much better.  He was noted to be hypoglycemic this morning with a glucose of 44.  He has since had coffee and treatment per nurse per protocol.  He is alert and oriented with no complaints.  He now does not want to go to [place] for rehabilitation he would rather go home with home health.    Review of Systems   Constitutional: Positive for activity change and fatigue. Negative for appetite change, chills and fever.   HENT: Negative for hearing loss, nosebleeds, tinnitus and trouble swallowing.    Eyes: Negative for visual disturbance.   Respiratory: Positive for shortness of breath. Negative for cough, chest tightness and wheezing.    Cardiovascular: Negative for chest pain, palpitations and leg swelling.   Gastrointestinal: Negative for abdominal distention, abdominal pain, blood in stool, constipation, diarrhea, nausea and vomiting.   Endocrine: Negative for cold intolerance, heat intolerance, polydipsia, polyphagia and polyuria.   Genitourinary: Negative for decreased urine volume, difficulty urinating, dysuria, flank pain, frequency and hematuria.   Musculoskeletal: Negative for arthralgias, joint swelling and myalgias.   Skin: Negative for rash.   Allergic/Immunologic: Negative for immunocompromised state.   Neurological: Positive for weakness. Negative for dizziness, syncope, light-headedness and headaches.   Hematological: Negative for adenopathy. Does not bruise/bleed easily.   Psychiatric/Behavioral: Negative for confusion and sleep disturbance. The patient is not nervous/anxious.       All pertinent negatives and positives are as above. All other systems have been reviewed and are negative  unless otherwise stated.     Objective    Temp:  [97.5 °F (36.4 °C)-98.6 °F (37 °C)] 97.5 °F (36.4 °C)  Heart Rate:  [55-66] 55  Resp:  [18-20] 18  BP: (104-145)/(32-50) 122/50     Physical Exam  Constitutional: He is oriented to person, place, and time. He appears cachectic. He appears ill.   Frail    HENT:   Head: Normocephalic and atraumatic.   Eyes: Conjunctivae and EOM are normal. Pupils are equal, round, and reactive to light.   Neck: Neck supple. No JVD present. No thyromegaly present.   Cardiovascular: Normal rate, regular rhythm, normal heart sounds and intact distal pulses. Exam reveals no gallop and no friction rub.   No murmur heard.  Pulmonary/Chest: Effort normal. No respiratory distress. He has wheezes. He has rales. He exhibits no tenderness.   improved   Abdominal: Soft. Bowel sounds are normal. He exhibits no distension. There is no tenderness. There is no rebound and no guarding.   Musculoskeletal: Normal range of motion. He exhibits no edema, tenderness or deformity.   Lymphadenopathy:   He has no cervical adenopathy.   Neurological: He is alert and oriented to person, place, and time. He displays normal reflexes. No cranial nerve deficit. He exhibits normal muscle tone.   Skin: Skin is warm and dry. No rash noted. There is cyanosis.   Psychiatric: He has a normal mood and affect. His behavior is normal. Judgment and thought content normal.   Vitals reviewed.    Results Review:  I have reviewed the labs, radiology results, and diagnostic studies.    Laboratory Data:     Results from last 7 days  Lab Units 03/25/17  0526 03/24/17  0602 03/23/17  0459   WBC 10*3/mm3 10.81* 13.71* 5.37   HEMOGLOBIN g/dL 9.7* 10.2* 10.2*   HEMATOCRIT % 30.3* 31.4* 31.8*   PLATELETS 10*3/mm3 216 221 201       Results from last 7 days  Lab Units 03/25/17  0526 03/24/17  0602 03/23/17  0459 03/22/17  1738   SODIUM mmol/L 134* 136 140 141   POTASSIUM mmol/L 4.2 4.8 4.2 3.8   CHLORIDE mmol/L 93* 94* 94* 94*   TOTAL CO2  "mmol/L 32.0* 31.0 34.0* 37.0*   BUN mg/dL 43* 65* 33* 18   CREATININE mg/dL 2.66* 3.71* 2.63* 1.85*   CALCIUM mg/dL 8.6 9.3 9.1 9.5   BILIRUBIN mg/dL  --   --   --  0.6   ALK PHOS U/L  --   --   --  101   ALT (SGPT) U/L  --   --   --  48   AST (SGOT) U/L  --   --   --  51*   GLUCOSE mg/dL 41* 136* 184* 137*     Culture Data:   Blood Culture   Date Value Ref Range Status   03/22/2017 No growth at 2 days  Preliminary   03/22/2017 No growth at 2 days  Preliminary     I have reviewed the patient current medications.     Assessment/Plan     Hospital Problem List     Chronic obstructive pulmonary disease with acute exacerbation        Assessment:  1. Acute dyspnea secondary to COPD exacerbation.   2. Hypertension.   3. Dyslipidemia.   4. Diabetes mellitus type 2.   5. Coronary artery disease.   6. Chronic congestive heart failure of uncertain type.   7. COPD.   8. Severe pulmonary hypertension.   9. End-stage renal disease necessitating hemodialysis.   10. Obstructive sleep apnea.   11. Anemia due to chronic disease likely related to chronic kidney disease.   12. Bilateral pleural effusions   13. Constipation      Plan:  1. Continue bowel regimen   2. Continue physical therapy  3. Continue mucinex, duonebs and Bipap PRN  4. Steroids and antibiotics now oral  5. Ambulate TID and up in chair for meals  6. CXR this AM pending  7. Consult  for , no longer wants to go to [facility] in [place], [place]  8. Keep close eye on blood glucose    Discharge Planning: I expect patient to be discharged to home in 2 days.    CHARO Zayas   03/25/17   8:03 AM    I personally evaluated and examined the patient in conjunction with CHARO Hernadez and agree with the assessment, treatment plan, and disposition of the patient as recorded by her. My history, exam, and further recommendations are: Patient states that he feels \"100% better\" than he did when he was admitted to the hospital.  I would like him to start getting up and " mobilizing more in anticipation of possible discharge back home soon.  We will go ahead and discontinue continuous pulse oximetry.  Patient is back down to 2-1/2 L by nasal cannula and currently his O2 sats are in the upper 90s.  Due to his hypoglycemia this morning, I will hold his regularly scheduled dose of Levemir at this evening, and monitor with sliding scale insulin coverage.  Patient has been transitioned to oral antibiotic and steroid therapy.  Continue present treatment.    Tiburcio Maguire MD  03/25/17  10:02 AM

## 2017-03-25 NOTE — PLAN OF CARE
Problem: Patient Care Overview (Adult)  Goal: Plan of Care Review  Outcome: Ongoing (interventions implemented as appropriate)    03/25/17 0551   Coping/Psychosocial Response Interventions   Plan Of Care Reviewed With patient;spouse   Patient Care Overview   Progress no change   Outcome Evaluation   Outcome Summary/Follow up Plan Patient on O2 at 2.5 NC and did wear BI- PAP this shift for several hours. Valium given prior to putting on due to anxiety. O2 saturation mid to high 90's. No C/O pain. Safety maintained. Patient had dialysis yesterday, BP DPB low. Patient does receive BP meds at HS.       03/25/17 0551   Coping/Psychosocial Response Interventions   Plan Of Care Reviewed With patient;spouse   Patient Care Overview   Progress no change   Outcome Evaluation   Outcome Summary/Follow up Plan Patient on O2 at 2.5 NC and did wear BI- PAP this shift for several hours. Valium given prior to putting on due to anxiety. O2 saturation mid to high 90's. No C/O pain. Safety maintained.             Problem: COPD, Chronic Bronchitis/Emphysema (Adult)  Goal: Signs and Symptoms of Listed Potential Problems Will be Absent or Manageable (COPD, Chronic Bronchitis/Emphysema)  Outcome: Ongoing (interventions implemented as appropriate)    Problem: Fall Risk (Adult)  Goal: Absence of Falls  Outcome: Ongoing (interventions implemented as appropriate)    Problem: Diabetes, Type 2 (Adult)  Goal: Signs and Symptoms of Listed Potential Problems Will be Absent or Manageable (Diabetes, Type 2)  Outcome: Ongoing (interventions implemented as appropriate)

## 2017-03-25 NOTE — PLAN OF CARE
Problem: Patient Care Overview (Adult)  Goal: Plan of Care Review  Outcome: Ongoing (interventions implemented as appropriate)    03/25/17 5012   Coping/Psychosocial Response Interventions   Plan Of Care Reviewed With patient;spouse   Patient Care Overview   Progress improving   Outcome Evaluation   Outcome Summary/Follow up Plan pt ambulated in hallway, ate well, and had no complaints today.         Problem: COPD, Chronic Bronchitis/Emphysema (Adult)  Goal: Signs and Symptoms of Listed Potential Problems Will be Absent or Manageable (COPD, Chronic Bronchitis/Emphysema)  Outcome: Ongoing (interventions implemented as appropriate)    Problem: Fall Risk (Adult)  Goal: Absence of Falls  Outcome: Ongoing (interventions implemented as appropriate)    Problem: Diabetes, Type 2 (Adult)  Goal: Signs and Symptoms of Listed Potential Problems Will be Absent or Manageable (Diabetes, Type 2)  Outcome: Ongoing (interventions implemented as appropriate)

## 2017-03-26 LAB
25(OH)D3 SERPL-MCNC: 46.8 NG/ML (ref 30–100)
ANION GAP SERPL CALCULATED.3IONS-SCNC: 11 MMOL/L (ref 4–13)
BUN BLD-MCNC: 70 MG/DL (ref 5–21)
BUN/CREAT SERPL: 17.5 (ref 7–25)
CALCIUM SPEC-SCNC: 8.9 MG/DL (ref 8.4–10.4)
CHLORIDE SERPL-SCNC: 90 MMOL/L (ref 98–110)
CO2 SERPL-SCNC: 29 MMOL/L (ref 24–31)
CREAT BLD-MCNC: 4 MG/DL (ref 0.5–1.4)
DEPRECATED RDW RBC AUTO: 47.7 FL (ref 40–54)
ERYTHROCYTE [DISTWIDTH] IN BLOOD BY AUTOMATED COUNT: 13.9 % (ref 12–15)
GFR SERPL CREATININE-BSD FRML MDRD: 15 ML/MIN/1.73
GLUCOSE BLD-MCNC: 169 MG/DL (ref 70–100)
GLUCOSE BLDC GLUCOMTR-MCNC: 145 MG/DL (ref 70–130)
GLUCOSE BLDC GLUCOMTR-MCNC: 212 MG/DL (ref 70–130)
GLUCOSE BLDC GLUCOMTR-MCNC: 249 MG/DL (ref 70–130)
GLUCOSE BLDC GLUCOMTR-MCNC: 275 MG/DL (ref 70–130)
HCT VFR BLD AUTO: 33.1 % (ref 40–52)
HGB BLD-MCNC: 11 G/DL (ref 14–18)
MCH RBC QN AUTO: 31.3 PG (ref 28–32)
MCHC RBC AUTO-ENTMCNC: 33.2 G/DL (ref 33–36)
MCV RBC AUTO: 94.3 FL (ref 82–95)
PLATELET # BLD AUTO: 210 10*3/MM3 (ref 130–400)
PMV BLD AUTO: 9.7 FL (ref 6–12)
POTASSIUM BLD-SCNC: 4.7 MMOL/L (ref 3.5–5.3)
PTH-INTACT SERPL-MCNC: 54.3 PG/ML (ref 7.5–53.5)
RBC # BLD AUTO: 3.51 10*6/MM3 (ref 4.8–5.9)
SODIUM BLD-SCNC: 130 MMOL/L (ref 135–145)
WBC NRBC COR # BLD: 9.39 10*3/MM3 (ref 4.8–10.8)

## 2017-03-26 PROCEDURE — 83970 ASSAY OF PARATHORMONE: CPT | Performed by: INTERNAL MEDICINE

## 2017-03-26 PROCEDURE — 94799 UNLISTED PULMONARY SVC/PX: CPT

## 2017-03-26 PROCEDURE — 25010000002 HEPARIN (PORCINE) PER 1000 UNITS: Performed by: INTERNAL MEDICINE

## 2017-03-26 PROCEDURE — 82306 VITAMIN D 25 HYDROXY: CPT | Performed by: INTERNAL MEDICINE

## 2017-03-26 PROCEDURE — 80048 BASIC METABOLIC PNL TOTAL CA: CPT | Performed by: NURSE PRACTITIONER

## 2017-03-26 PROCEDURE — 63710000001 PREDNISONE PER 1 MG: Performed by: INTERNAL MEDICINE

## 2017-03-26 PROCEDURE — 85027 COMPLETE CBC AUTOMATED: CPT | Performed by: NURSE PRACTITIONER

## 2017-03-26 PROCEDURE — 82962 GLUCOSE BLOOD TEST: CPT

## 2017-03-26 RX ADMIN — DOCUSATE SODIUM 100 MG: 100 CAPSULE ORAL at 09:25

## 2017-03-26 RX ADMIN — PREDNISONE 20 MG: 20 TABLET ORAL at 09:25

## 2017-03-26 RX ADMIN — INSULIN LISPRO 4 UNITS: 100 INJECTION, SOLUTION INTRAVENOUS; SUBCUTANEOUS at 12:36

## 2017-03-26 RX ADMIN — BUDESONIDE AND FORMOTEROL FUMARATE DIHYDRATE 2 PUFF: 160; 4.5 AEROSOL RESPIRATORY (INHALATION) at 07:26

## 2017-03-26 RX ADMIN — METOPROLOL TARTRATE 50 MG: 50 TABLET ORAL at 21:14

## 2017-03-26 RX ADMIN — INSULIN LISPRO 6 UNITS: 100 INJECTION, SOLUTION INTRAVENOUS; SUBCUTANEOUS at 17:57

## 2017-03-26 RX ADMIN — LEVOFLOXACIN 250 MG: 250 TABLET, FILM COATED ORAL at 17:57

## 2017-03-26 RX ADMIN — HEPARIN SODIUM 5000 UNITS: 5000 INJECTION, SOLUTION INTRAVENOUS; SUBCUTANEOUS at 09:25

## 2017-03-26 RX ADMIN — AMLODIPINE BESYLATE 5 MG: 5 TABLET ORAL at 21:15

## 2017-03-26 RX ADMIN — DOCUSATE SODIUM 100 MG: 100 CAPSULE ORAL at 17:57

## 2017-03-26 RX ADMIN — CLOPIDOGREL BISULFATE 75 MG: 75 TABLET, FILM COATED ORAL at 09:25

## 2017-03-26 RX ADMIN — BUDESONIDE AND FORMOTEROL FUMARATE DIHYDRATE 2 PUFF: 160; 4.5 AEROSOL RESPIRATORY (INHALATION) at 19:06

## 2017-03-26 RX ADMIN — INSULIN LISPRO 4 UNITS: 100 INJECTION, SOLUTION INTRAVENOUS; SUBCUTANEOUS at 21:15

## 2017-03-26 RX ADMIN — DESMOPRESSIN ACETATE 80 MG: 0.2 TABLET ORAL at 21:14

## 2017-03-26 RX ADMIN — METOPROLOL TARTRATE 50 MG: 50 TABLET ORAL at 09:25

## 2017-03-26 RX ADMIN — PANTOPRAZOLE SODIUM 40 MG: 40 TABLET, DELAYED RELEASE ORAL at 06:10

## 2017-03-26 RX ADMIN — AMLODIPINE BESYLATE 5 MG: 5 TABLET ORAL at 09:25

## 2017-03-26 RX ADMIN — HEPARIN SODIUM 5000 UNITS: 5000 INJECTION, SOLUTION INTRAVENOUS; SUBCUTANEOUS at 21:15

## 2017-03-26 NOTE — PROGRESS NOTES
Continued Stay Note  [facility]     Patient Name: Nicolas Roberts  MRN: [id number]  Today's Date: 3/26/2017    Admit Date: 3/22/2017          Discharge Plan       03/26/17 1011    Case Management/Social Work Plan    Plan Pt is now requesting to be placed at [facility] and get dialysis in [place].  A referral has been faxed to [facility] fax: [phone], phone: [phone].  Will follow for bed offer.  RENEE Pittman.    Patient/Family In Agreement With Plan yes              Discharge Codes     None            RENEE Chacon

## 2017-03-26 NOTE — PROGRESS NOTES
[facility] Medicine Services  INPATIENT PROGRESS NOTE    Length of Stay: 4  Date of Admission: 3/22/2017  Primary Care Physician: Rafita Harris MD    Subjective   Chief Complaint: follow up soa    HPI   The patient is currently sitting up on the side of the bed.  He states he feels good today.  They have talked at length about rehabilitation.  They now want to be listed at [facility] they have no longer wish to go to [place].  He is doing much better.    Review of Systems   Constitutional: Positive for activity change and fatigue. Negative for appetite change and chills.   HENT: Negative for hearing loss, nosebleeds, tinnitus and trouble swallowing.    Eyes: Negative for visual disturbance.   Respiratory: Negative for cough and chest tightness.    Cardiovascular: Negative for palpitations.   Gastrointestinal: Negative for abdominal distention, blood in stool, constipation, diarrhea and nausea.   Endocrine: Negative for cold intolerance, heat intolerance, polydipsia, polyphagia and polyuria.   Genitourinary: Negative for decreased urine volume, difficulty urinating, dysuria, flank pain, frequency and hematuria.   Musculoskeletal: Negative for arthralgias, joint swelling and myalgias.   Allergic/Immunologic: Negative for immunocompromised state.   Neurological: Positive for weakness. Negative for dizziness, syncope and light-headedness.   Hematological: Negative for adenopathy. Does not bruise/bleed easily.   Psychiatric/Behavioral: Negative for confusion and sleep disturbance. The patient is not nervous/anxious.       All pertinent negatives and positives are as above. All other systems have been reviewed and are negative unless otherwise stated.     Objective    Temp:  [97 °F (36.1 °C)-98.8 °F (37.1 °C)] 97.4 °F (36.3 °C)  Heart Rate:  [55-82] 78  Resp:  [18-20] 20  BP: (114-142)/(43-52) 118/51     Physical Exam  Constitutional: He is oriented to person, place, and time.  He appears cachectic. He appears ill.   Frail    HENT:   Head: Normocephalic and atraumatic.   Eyes: Conjunctivae and EOM are normal. Pupils are equal, round, and reactive to light.   Neck: Neck supple. No JVD present. No thyromegaly present.   Cardiovascular: Normal rate, regular rhythm, normal heart sounds and intact distal pulses. Exam reveals no gallop and no friction rub.   No murmur heard.  Pulmonary/Chest: Effort normal. No respiratory distress. He has wheezes. He has rales. He exhibits no tenderness.   improved   Abdominal: Soft. Bowel sounds are normal. He exhibits no distension. There is no tenderness. There is no rebound and no guarding.   Musculoskeletal: Normal range of motion. He exhibits no edema, tenderness or deformity.   Lymphadenopathy:   He has no cervical adenopathy.   Neurological: He is alert and oriented to person, place, and time. He displays normal reflexes. No cranial nerve deficit. He exhibits normal muscle tone.   Skin: Skin is warm and dry. No rash noted. There is cyanosis.   Psychiatric: He has a normal mood and affect. His behavior is normal. Judgment and thought content normal.   Vitals reviewed.    Results Review:  I have reviewed the labs, radiology results, and diagnostic studies.    Laboratory Data:     Results from last 7 days  Lab Units 03/25/17  0526 03/24/17  0602 03/23/17  0459   WBC 10*3/mm3 10.81* 13.71* 5.37   HEMOGLOBIN g/dL 9.7* 10.2* 10.2*   HEMATOCRIT % 30.3* 31.4* 31.8*   PLATELETS 10*3/mm3 216 221 201       Results from last 7 days  Lab Units 03/25/17  0526 03/24/17  0602 03/23/17  0459 03/22/17  1738   SODIUM mmol/L 134* 136 140 141   POTASSIUM mmol/L 4.2 4.8 4.2 3.8   CHLORIDE mmol/L 93* 94* 94* 94*   TOTAL CO2 mmol/L 32.0* 31.0 34.0* 37.0*   BUN mg/dL 43* 65* 33* 18   CREATININE mg/dL 2.66* 3.71* 2.63* 1.85*   CALCIUM mg/dL 8.6 9.3 9.1 9.5   BILIRUBIN mg/dL  --   --   --  0.6   ALK PHOS U/L  --   --   --  101   ALT (SGPT) U/L  --   --   --  48   AST (SGOT) U/L   --   --   --  51*   GLUCOSE mg/dL 41* 136* 184* 137*     Culture Data:   Blood Culture   Date Value Ref Range Status   03/22/2017 No growth at 2 days  Preliminary   03/22/2017 No growth at 2 days  Preliminary     I have reviewed the patient current medications.     Assessment/Plan     Hospital Problem List     Chronic obstructive pulmonary disease with acute exacerbation        Assessment:  1. Acute dyspnea secondary to COPD exacerbation.   2. Hypertension.   3. Dyslipidemia.   4. Diabetes mellitus type 2.   5. Coronary artery disease.   6. Chronic congestive heart failure of uncertain type.   7. COPD.   8. Severe pulmonary hypertension.   9. End-stage renal disease necessitating hemodialysis.   10. Obstructive sleep apnea.   11. Anemia due to chronic disease likely related to chronic kidney disease.   12. Bilateral pleural effusions   13. Constipation      Plan:  1.  Consult  for placement at [facility] rather than [facility]  2.  Continue bowel regimen  3.  Continue physical therapy  4.  Levaquin ×3 doses received thus far  5.  Labs in a.m.  6.  Hemodialysis to be done tomorrow.  Discussed above plan with Bernardo Alfonso.  Patient has changed wishes for placement. Plans for dialysis in the am.  Likely can discharge once placement arranged.  Katt Livingston MD  Discharge Planning: I expect patient to be discharged to Rehab in 2 days.    Bernardo Alfonso, APRN   03/26/17   6:57 AM

## 2017-03-26 NOTE — DISCHARGE PLACEMENT REQUEST
"To:  Jorge  From:  Sofi Mendez, RENOW.  [phone].    Nicolas Dasilva (72 y.o. Male)     Date of Birth Social Security Number Address Home Phone MRN    1945  [address] [place] [address]  [place] [address] [phone] [id number]    Jehovah's witness Marital Status          Other        Admission Date Admission Type Admitting Provider Attending Provider Department, Room/Bed    3/22/17 Emergency Katt Livingston MD Ruxer, Jena Thomas, MD [address], 481/1    Discharge Date Discharge Disposition Discharge Destination                      Attending Provider: Katt Livingston MD     Allergies:  Contrast Dye, Promethazine Hcl, Hydrocodone-acetaminophen    Isolation:  None   Infection:  None   Code Status:  FULL    Ht:  67\" (170.2 cm)   Wt:  125 lb 12.8 oz (57.1 kg)    Admission Cmt:  None   Principal Problem:  None                Active Insurance as of 3/22/2017     Primary Coverage     Payor Plan Insurance Group Employer/Plan Group    MEDICARE MEDICARE A & B      Payor Plan Address Payor Plan Phone Number Effective From Effective To    [address] [phone] 1/1/2008     [place] [address]       Subscriber Name Subscriber Birth Date Member ID       NICOLAS DASILVA 1945 [id number]           Secondary Coverage     Payor Plan Insurance Group Employer/Plan Group    [employer]      Payor Plan Address Payor Plan Phone Number Effective From Effective To    [address] [phone] 1/1/2008     [place] [address]       Subscriber Name Subscriber Birth Date Member ID       NICOLAS DASILVA 1945 [id number]                 Emergency Contacts      (Rel.) Home Phone Work Phone Mobile Phone    Da Dasilva (Spouse) [phone] -- --               History & Physical      H&P signed by Pato Alfred MD at 3/23/2017  5:03 AM              TIME: 9:31 p.m.    PRIMARY CARE PHYSICIAN: Rafita Harris MD    HISTORY OF PRESENT ILLNESS: " Mr. Roberts is a 72-year-old  male who presents to [facility] due to a multiplicity of complaints including fatigue, malaise, generalized weakness, poor appetite, weight loss, and shortness of breath. His symptoms have been present and worsening for at least 1 week. Mr. Roberts undergoes hemodialysis on Monday, Wednesdays and Fridays. Today, after dialysis, he was much weaker than usual. He was directed to the emergency department. Laboratory and imaging studies are consistent with acute respiratory insufficiency, likely associated with congestive heart failure. Chest x-ray demonstrates vascular congestion with cardiomegaly and bilateral pleural effusions. Mr. Roberts will require admission to the hospital for further evaluation and treatment. Presently, he is resting comfortably and is in no distress. However, he is profoundly weak. His wife is present at the bedside.     REVIEW OF SYSTEMS: Essentially unremarkable from a cardiovascular, pulmonary, gastrointestinal, genitourinary, neurologic, psychiatric, metabolic and constitutional standpoint except as noted. The patient relates chronic and worsening fatigue, malaise, lethargy, and weakness. He has had no recent fevers, chills, or sweats. He relates a poor appetite with weight loss. He relates having intermittent chest pain, although this does not appear to be a major complaint. He has had no chest palpitations. He relates worsening shortness of breath. He has had no lower extremity edema, orthopnea, cough, wheezing, or hemoptysis. He has had no abdominal pain, nausea, vomiting, diarrhea. He relates recurring constipation. He has had no dyspepsia, dysphagia, odynophagia, hematemesis, hematochezia, or melena. He has had no pelvic pain, flank pain, hematuria, or dysuria. He has had no skin rashes, arthralgias, myalgias, or swollen joints. He has had no headache, confusion, memory deficits or loss of consciousness. He has had no recent  changes in his vision or hearing. He has had no acute motor or sensory deficits. He has had no tremor. He relates having an unsteady gait and has suffered several falls recently.     PAST MEDICAL HISTORY:  1.  Hypertension.   2.  Dyslipidemia.   3.  Diabetes mellitus type 2.   4.  Coronary artery disease.   5.  Chronic congestive heart failure of uncertain type.   6.  COPD.   7.  Severe pulmonary hypertension.   8.  Obstructive sleep apnea necessitating CPAP therapy.   9.  Chronic hypoxic respiratory failure necessitating home oxygen use.   10.  End-stage renal disease necessitating hemodialysis.   11.  Gastroesophageal reflux disease.   12.  Hiatal hernia.   13.  Sylvester's esophagus.   14.  Gout.   15.  Raynaud’s phenomenon.   16.  Anemia of chronic disease, likely related to chronic kidney disease.   17.  Hearing impairment.   18.  Renal artery stenosis.   19.  Bilateral carotid stenosis.   20.  Hearing impairment.     PAST SURGICAL HISTORY:  1.  Status post cardiac stent deployment.   2.  Status post renal artery stent deployment.   3.  Status post sinus surgery of uncertain type.   4.  Status post bilateral carotid endarterectomy.   5.  Status post left inguinal hernia repair.     HOME MEDICATIONS:  1.  Albuterol 1-4 inhalations q.4 h. p.r.n. for shortness of breath and/or wheezing.   2.  Albuterol nebulizer 1 unit q.6 h. p.r.n. for shortness of breath and/or wheezing.   3.  Allopurinol 100 mg p.o. b.i.d.   4.  Norvasc 5 mg p.o. b.i.d.   5.  Lipitor 80 mg p.o. at bedtime.   6.  Edbra-Rose 1 p.o. daily.   7.  Plavix 75 mg p.o. daily.   8.  Auryxia 1 p.o. daily.   9.  Lantus insulin 10 units subcutaneous at bedtime.   10.  Metoprolol 100 mg p.o. daily.   11.  Nitroglycerin sublingual tablets 0.4 mg p.o. p.r.n. for chest pain.   12.  Prilosec 20 mg p.o. b.i.d.   13.  Altace 10 mg p.o. b.i.d.     ALLERGIES:  1.  CONTRAST DYE.  2.  PROMETHAZINE.  3.  HYDROCODONE.    SOCIAL HISTORY: Significant for being a resident of  [place], [place]. He is . He has 2 daughters in good health, although 1 has a history of diabetes. He is a retired . He has an 11th grade education. He smoked a variable amount of tobacco for approximately 25 years, but quit smoking approximately 34 years ago. He drinks Coors beer on rare occasions, but not regularly. He has no history of illicit drug use. He describes his [employer] affiliation as [employer]. He has no recent history of travel outside this region.     He is a FULL CODE.     He designates his wife Da to serve as a surrogate for healthcare matters should such become necessary.     FAMILY HISTORY: Significant for having a brother in apparent good health. His father is  due to heart attack. His mother is  due to lymphoma of uncertain type.     PHYSICAL EXAMINATION:    VITAL SIGNS: Temperature is 98.3, pulse 75, respirations are 20 and unlabored, blood pressure is 169/63 and O2 saturations are 90% breathing supplemental oxygen, weight is 113 pounds.     GENERAL: This is a 72-year-old  male appearing his documented age. He is resting comfortably in bed. He is in no apparent distress. He is requesting a diet. He appears chronically ill. He is hearing impaired. He proves to be a poor historian. His wife is present at the bedside.     HEAD AND NECK: Essentially unremarkable except as noted. He has contusion on his left cheek. Eyes, nose, and throat are essentially unremarkable. Sclerae are clear. There is no discharge in the nostrils. Mucous membranes are moist. Neck is supple. He has no cervical or clavicular adenopathy. He has bilateral carotid bruits. There are no masses of the head or neck. Neck veins do not appear pathologically distended.     CARDIAC: Reveals S1 and S2 with a regular rhythm. He has no definite murmurs, rubs, or gallops.     LUNGS: Reveals bilateral breath sounds that are clear to auscultation throughout. He has no rales, wheezes, or  rhonchi.     ABDOMEN: Reveals bowel sounds to be present. His abdomen is nontender, nondistended and soft.     EXTREMITIES: No lower extremity edema, erythema or calf tenderness.     NEUROLOGIC: Reveals the patient to be awake and alert. He seems oriented to person, place, time and situation. Cranial nerves II-XII appear grossly intact. He exhibits no definite focal, motor or sensory deficits. He is profoundly weak. His gait was not tested.     PSYCHIATRIC: Reveals his mood to be stable. Affect seems appropriate. Thought processes are organized in that he is able to answer questions and provide somewhat of a history. There is no flight of ideas. Speech is fluent. There are no obvious short-term or long-term memory deficits. Note that psychiatric examination is suboptimal due to the patient's hearing impairment. He also has a speech impediment.     DIAGNOSTIC DATA: Sodium 141, potassium 3.8, chloride 94, CO2 of 37, BUN 18, creatinine 1.85, glucose 137, total calcium 9.5.     Liver function testing is essentially unremarkable except for an AST of 51.     CBC demonstrates a white blood cell count of 9.35, hemoglobin 11.5, hematocrit 35.7, platelet count of 206,000.     Arterial blood gas demonstrates a pH of 7.327, pCO2 of 65.9, pO2 of 90 and O2 saturation is 96%.     Chest x-ray demonstrates vascular congestion with cardiomegaly and bilateral pleural effusions.     EKG demonstrates sinus rhythm of 77 beats per minute. There is evidence of LVH.     IMPRESSION:  1.  Acute dyspnea secondary to COPD exacerbation.   2.  Hypertension.   3.  Dyslipidemia.   4.  Diabetes mellitus type 2.   5.  Coronary artery disease.   6.  Chronic congestive heart failure of uncertain type.   7.  COPD.   8.  Severe pulmonary hypertension.   9.  End-stage renal disease necessitating hemodialysis.   10.  Obstructive sleep apnea.   11.  Anemia due to chronic disease likely related to chronic kidney disease.     PLAN: At this time, Mr. Roberts  will be admitted to [facility] for further evaluation and treatment. His multiple admitting diagnoses are as noted. His condition at this time is guarded. His prognosis is poor.     I have asked the nursing staff to obtain vital signs per protocol. He will be confined to bedrest. His allergies to CONTRAST DYE, PROMETHAZINE and HYDROCODONE are noted. I have asked the nursing staff to monitor input and output. Daily weights will be obtained. He will be maintained on a renal diet. IV fluids will be saline locked. Oxygen will be used as needed to maintain his O2 saturations greater than 92%. He is a FULL CODE. Fall precautions are to be initiated.     I will consult the Nephrology service.     INITIAL ADMITTING MEDICATIONS:  1.  Allopurinol 100 mg p.o. b.i.d.   2.  Norvasc 5 mg p.o. q.12 h.   3.  Lipitor 80 mg p.o. daily.   4.  Plavix 75 mg p.o. daily.   5.  Heparin 5000 units subcutaneous q.12 h.   6.  Levemir 10 units subcutaneous at bedtime.   7.  Levaquin 500 mg IV q.48 h.   8.  Solu-Medrol 60 mg IV q.12 h.   9.  Metoprolol 50 mg p.o. q.12 h.   10.  Altace 10 mg p.o. b.i.d.   11.  Tylenol 650 mg p.o. q.6 h. p.r.n. for fever and/or discomfort.   12.  DuoNeb 1 unit q.4 h. p.r.n. for shortness of breath.   13.  Nitroglycerin sublingual tablets 0.4 mg p.o. p.r.n. for chest pain.   14.  Zofran 4 mg IV q.6 h. p.r.n. for nausea and vomiting.     I will institute BiPAP therapy.     I will obtain followup laboratory studies in the morning.     I will continue to follow Mr. Roberts closely through the night pending return of the hospitalist team in the morning. The nursing staff may call should they have any questions or concerns. Please refer to the medical record for additional information, orders and/or comments.       cc:  KOBE STEPHENSON M.D. JRP/[id number]  D:  03/22/2017 22:48:49(Eastern Time)  T:  03/22/2017 23:27:54(Eastern Time)  Voice ID:  [id number]/Document ID:  [id number]        Electronically signed by Pato Alfred MD at 3/23/2017  5:03 AM        Prior to Admission Medications     Prescriptions Last Dose Informant Patient Reported? Taking?    albuterol (PROVENTIL HFA;VENTOLIN HFA) 108 (90 BASE) MCG/ACT inhaler 3/22/2017 Pharmacy Yes Yes    Inhale 1 puff Every 4 (Four) Hours As Needed for Wheezing.    albuterol (PROVENTIL) (2.5 MG/3ML) 0.083% nebulizer solution 3/22/2017 Pharmacy Yes Yes    Take 2.5 mg by nebulization Every 6 (Six) Hours As Needed for Wheezing or Shortness of Air.    allopurinol (ZYLOPRIM) 100 MG tablet 3/22/2017  Yes Yes    Take 100 mg by mouth 2 (Two) Times a Day.    amLODIPine (NORVASC) 5 MG tablet 3/21/2017 Pharmacy Yes Yes    Take 5 mg by mouth 2 (Two) Times a Day.    atorvastatin (LIPITOR) 80 MG tablet 3/21/2017 Pharmacy Yes Yes    Take 80 mg by mouth Every Night.    B Complex-C-Folic Acid (JABIER-ARIS PO) Past Week Pharmacy Yes Yes    Take 1 tablet by mouth Daily.    clopidogrel (PLAVIX) 75 MG tablet 3/22/2017  Yes Yes    Take 75 mg by mouth Daily.    Ferric Citrate (AURYXIA) 1  MG(FE) tablet 3/21/2017 Spouse/Significant Other Yes Yes    Take 1 tablet by mouth Every Night.    Insulin Glargine (LANTUS SOLOSTAR) 100 UNIT/ML injection pen 3/20/2017 Spouse/Significant Other Yes Yes    Inject 10 Units under the skin Every Night.    metoprolol tartrate (LOPRESSOR) 100 MG tablet 3/22/2017 Pharmacy Yes Yes    Take 100 mg by mouth Daily.    nitroglycerin (NITROSTAT) 0.4 MG SL tablet Past Month Pharmacy Yes Yes    Place 0.4 mg under the tongue Every 5 (Five) Minutes As Needed for chest pain. Take no more than 3 doses in 15 minutes.    omeprazole (PriLOSEC) 20 MG capsule 3/22/2017 Pharmacy Yes Yes    Take 20 mg by mouth 2 (Two) Times a Day.    ramipril (ALTACE) 10 MG capsule 3/22/2017 Pharmacy Yes Yes    Take 10 mg by mouth 2 (Two) Times a Day.          Hospital Medications (active)       Dose Frequency Start End    acetaminophen (TYLENOL) tablet 650 mg 650 mg Every 6  Hours PRN 3/22/2017     Sig - Route: Take 2 tablets by mouth Every 6 (Six) Hours As Needed for Mild Pain (1-3) or Fever. - Oral    amLODIPine (NORVASC) tablet 5 mg 5 mg Every 12 Hours Scheduled 3/22/2017     Sig - Route: Take 1 tablet by mouth Every 12 (Twelve) Hours. - Oral    atorvastatin (LIPITOR) tablet 80 mg 80 mg Nightly 3/22/2017     Sig - Route: Take 2 tablets by mouth Every Night. - Oral    budesonide-formoterol (SYMBICORT) 160-4.5 MCG/ACT inhaler 2 puff 2 puff 2 Times Daily - RT 3/24/2017     Sig - Route: Inhale 2 puffs 2 (Two) Times a Day. - Inhalation    CloNIDine (CATAPRES) tablet 0.1 mg 0.1 mg Every 6 Hours PRN 3/22/2017     Sig - Route: Take 1 tablet by mouth Every 6 (Six) Hours As Needed (For SBP greater than 160.  Hold for HR less 65, or less.). - Oral    clopidogrel (PLAVIX) tablet 75 mg 75 mg Daily 3/23/2017     Sig - Route: Take 1 tablet by mouth Daily. - Oral    dextrose (D50W) solution 25 g 25 g Every 15 Minutes PRN 3/22/2017     Sig - Route: Infuse 50 mL into a venous catheter Every 15 (Fifteen) Minutes As Needed for Low Blood Sugar (Blood Sugar Less Than 70, Patient Has IV Access - Unresponsive, NPO or Unable To Safely Swallow). - Intravenous    dextrose (GLUTOSE) oral gel 15 g 15 g Every 15 Minutes PRN 3/22/2017     Sig - Route: Take 15 g by mouth Every 15 (Fifteen) Minutes As Needed for Low Blood Sugar (Blood Sugar Less Than 70, Patient Alert, Is Not NPO & Can Safely Swallow). - Oral    diazePAM (VALIUM) tablet 2 mg 2 mg Every 8 Hours PRN 3/24/2017 4/3/2017    Sig - Route: Take 1 tablet by mouth Every 8 (Eight) Hours As Needed for Anxiety. - Oral    docusate sodium (COLACE) capsule 100 mg 100 mg 2 Times Daily 3/24/2017     Sig - Route: Take 1 capsule by mouth 2 (Two) Times a Day. - Oral    glucagon (human recombinant) (GLUCAGEN DIAGNOSTIC) injection 1 mg 1 mg Every 15 Minutes PRN 3/22/2017     Sig - Route: Inject 1 mg under the skin Every 15 (Fifteen) Minutes As Needed (Blood Glucose Less  Than 70 - Patient Without IV Access - Unresponsive, NPO or Unable To Safely Swallow). - Subcutaneous    heparin (porcine) 5000 UNIT/ML injection 5,000 Units 5,000 Units Every 12 Hours Scheduled 3/22/2017     Sig - Route: Inject 1 mL under the skin Every 12 (Twelve) Hours. - Subcutaneous    heparin (porcine) injection 1,800 Units 1,800 Units As Needed 3/24/2017     Sig - Route: 1.8 mL by Intracatheter route As Needed (permcath packing after EVERY HD). - Intracatheter    heparin (porcine) injection 1,800 Units 1,800 Units As Needed 3/24/2017     Sig - Route: 1.8 mL by Intracatheter route As Needed (1.8 cc after every hd tx). - Intracatheter    insulin lispro (humaLOG) injection 0-9 Units 0-9 Units 4 Times Daily Before Meals & Nightly 3/22/2017     Sig - Route: Inject 0-9 Units under the skin 4 (Four) Times a Day Before Meals & at Bedtime. - Subcutaneous    ipratropium-albuterol (DUO-NEB) nebulizer solution 3 mL 3 mL Every 4 Hours PRN 3/22/2017     Sig - Route: Take 3 mL by nebulization Every 4 (Four) Hours As Needed for Wheezing or Shortness of Air. - Nebulization    levoFLOXacin (LEVAQUIN) tablet 250 mg 250 mg Every Other Day 3/24/2017     Sig - Route: Take 1 tablet by mouth Every Other Day. - Oral    metoprolol tartrate (LOPRESSOR) tablet 50 mg 50 mg Every 12 Hours Scheduled 3/22/2017     Sig - Route: Take 1 tablet by mouth Every 12 (Twelve) Hours. - Oral    nitroglycerin (NITROSTAT) SL tablet 0.4 mg 0.4 mg Every 5 Minutes PRN 3/22/2017     Sig - Route: Place 1 tablet under the tongue Every 5 (Five) Minutes As Needed for Chest Pain. - Sublingual    ondansetron (ZOFRAN) injection 4 mg 4 mg Every 6 Hours PRN 3/22/2017     Sig - Route: Infuse 2 mL into a venous catheter Every 6 (Six) Hours As Needed for Nausea or Vomiting. - Intravenous    pantoprazole (PROTONIX) EC tablet 40 mg 40 mg Every Early Morning 3/23/2017     Sig - Route: Take 1 tablet by mouth Every Morning. - Oral    predniSONE (DELTASONE) tablet 20 mg 20 mg  "Daily With Breakfast 3/25/2017     Sig - Route: Take 1 tablet by mouth Daily With Breakfast. - Oral    sodium chloride 0.9 % flush 1-10 mL 1-10 mL As Needed 3/22/2017     Sig - Route: Infuse 1-10 mL into a venous catheter As Needed for Line Care. - Intravenous    sodium chloride 0.9 % flush 10 mL 10 mL As Needed 3/22/2017     Sig - Route: Infuse 10 mL into a venous catheter As Needed for Line Care. - Intravenous    Linked Group 1:  \"And\" Linked Group Details              Operative/Procedure Notes (most recent note)     No notes of this type exist for this encounter.           Physician Progress Notes (most recent note)      CHARO Zayas at 3/26/2017  6:57 AM  Version 1 of 1             [facility] Medicine Services  INPATIENT PROGRESS NOTE    Length of Stay: 4  Date of Admission: 3/22/2017  Primary Care Physician: Rafita Harris MD    Subjective   Chief Complaint: follow up soa    HPI   The patient is currently sitting up on the side of the bed.  He states he feels good today.  They have talked at length about rehabilitation.  They now want to be listed at [facility] they have no longer wish to go to [facility].  He is doing much better.    Review of Systems   Constitutional: Positive for activity change and fatigue. Negative for appetite change and chills.   HENT: Negative for hearing loss, nosebleeds, tinnitus and trouble swallowing.    Eyes: Negative for visual disturbance.   Respiratory: Negative for cough and chest tightness.    Cardiovascular: Negative for palpitations.   Gastrointestinal: Negative for abdominal distention, blood in stool, constipation, diarrhea and nausea.   Endocrine: Negative for cold intolerance, heat intolerance, polydipsia, polyphagia and polyuria.   Genitourinary: Negative for decreased urine volume, difficulty urinating, dysuria, flank pain, frequency and hematuria.   Musculoskeletal: Negative for arthralgias, joint swelling and myalgias. "   Allergic/Immunologic: Negative for immunocompromised state.   Neurological: Positive for weakness. Negative for dizziness, syncope and light-headedness.   Hematological: Negative for adenopathy. Does not bruise/bleed easily.   Psychiatric/Behavioral: Negative for confusion and sleep disturbance. The patient is not nervous/anxious.       All pertinent negatives and positives are as above. All other systems have been reviewed and are negative unless otherwise stated.     Objective    Temp:  [97 °F (36.1 °C)-98.8 °F (37.1 °C)] 97.4 °F (36.3 °C)  Heart Rate:  [55-82] 78  Resp:  [18-20] 20  BP: (114-142)/(43-52) 118/51     Physical Exam  Constitutional: He is oriented to person, place, and time. He appears cachectic. He appears ill.   Frail    HENT:   Head: Normocephalic and atraumatic.   Eyes: Conjunctivae and EOM are normal. Pupils are equal, round, and reactive to light.   Neck: Neck supple. No JVD present. No thyromegaly present.   Cardiovascular: Normal rate, regular rhythm, normal heart sounds and intact distal pulses. Exam reveals no gallop and no friction rub.   No murmur heard.  Pulmonary/Chest: Effort normal. No respiratory distress. He has wheezes. He has rales. He exhibits no tenderness.   improved   Abdominal: Soft. Bowel sounds are normal. He exhibits no distension. There is no tenderness. There is no rebound and no guarding.   Musculoskeletal: Normal range of motion. He exhibits no edema, tenderness or deformity.   Lymphadenopathy:   He has no cervical adenopathy.   Neurological: He is alert and oriented to person, place, and time. He displays normal reflexes. No cranial nerve deficit. He exhibits normal muscle tone.   Skin: Skin is warm and dry. No rash noted. There is cyanosis.   Psychiatric: He has a normal mood and affect. His behavior is normal. Judgment and thought content normal.   Vitals reviewed.    Results Review:  I have reviewed the labs, radiology results, and diagnostic  studies.    Laboratory Data:     Results from last 7 days  Lab Units 03/25/17  0526 03/24/17  0602 03/23/17  0459   WBC 10*3/mm3 10.81* 13.71* 5.37   HEMOGLOBIN g/dL 9.7* 10.2* 10.2*   HEMATOCRIT % 30.3* 31.4* 31.8*   PLATELETS 10*3/mm3 216 221 201       Results from last 7 days  Lab Units 03/25/17  0526 03/24/17  0602 03/23/17  0459 03/22/17  1738   SODIUM mmol/L 134* 136 140 141   POTASSIUM mmol/L 4.2 4.8 4.2 3.8   CHLORIDE mmol/L 93* 94* 94* 94*   TOTAL CO2 mmol/L 32.0* 31.0 34.0* 37.0*   BUN mg/dL 43* 65* 33* 18   CREATININE mg/dL 2.66* 3.71* 2.63* 1.85*   CALCIUM mg/dL 8.6 9.3 9.1 9.5   BILIRUBIN mg/dL  --   --   --  0.6   ALK PHOS U/L  --   --   --  101   ALT (SGPT) U/L  --   --   --  48   AST (SGOT) U/L  --   --   --  51*   GLUCOSE mg/dL 41* 136* 184* 137*     Culture Data:   Blood Culture   Date Value Ref Range Status   03/22/2017 No growth at 2 days  Preliminary   03/22/2017 No growth at 2 days  Preliminary     I have reviewed the patient current medications.     Assessment/Plan     Hospital Problem List     Chronic obstructive pulmonary disease with acute exacerbation        Assessment:  1. Acute dyspnea secondary to COPD exacerbation.   2. Hypertension.   3. Dyslipidemia.   4. Diabetes mellitus type 2.   5. Coronary artery disease.   6. Chronic congestive heart failure of uncertain type.   7. COPD.   8. Severe pulmonary hypertension.   9. End-stage renal disease necessitating hemodialysis.   10. Obstructive sleep apnea.   11. Anemia due to chronic disease likely related to chronic kidney disease.   12. Bilateral pleural effusions   13. Constipation      Plan:  1.  Consult  for placement at [facility] rather than [place]  2.  Continue bowel regimen  3.  Continue physical therapy  4.  Levaquin ×3 doses received thus far  5.  Labs in a.m.  6.  Hemodialysis to be done tomorrow.    Discharge Planning: I expect patient to be discharged to Rehab in 2 days.    Bernardo Alfonso, APRN   03/26/17    6:57 AM       Electronically signed by CHARO Zayas at 3/26/2017  9:46 AM           Consult Notes (most recent note)      CHARO Omalley at 3/23/2017 11:57 AM  Version 1 of 1     Consult Orders:    1. Inpatient Consult to Nephrology [[id number]] ordered by Pato Alfred MD at 03/22/17 2158           Attestation signed by Blaine Phipps MD at 3/23/2017  8:24 PM        I have independently interviewed and examined the patient and reviewed the laboratory, imaging, notes and all other records as available.  I have discussed key elements of the care plan with the APRN and agree with the findings and care plan documented above except as noted.      Subjective:  Consulted for ESRD.  HD MWF.  No n/v/d.      Objective:  Vitals/labs/studies/notes all reviewed  Exam independently verified    Assessment:  1. End stage renal disease on hemodialysis Monday Wednesday Friday  2. Acute exacerbation of chronic obstructive pulmonary disease  3. Chronic congestive heart failure  4. Anemia of chronic disease  5. Hypertension  6. Diabetes mellitus type 2     Plan:  HD MWF  Monitor labs    Blaine Phipps MD  3/23/2017  8:23 PM                                 Nephrology ([facility]) Consult Note      Patient:  Nicolas Roberts  YOB: 1945  Date of Service: 3/23/2017  MRN: [id number]   Acct: [id number]   Primary Care Physician: Rafita Harris MD  Advance Directive: Full Code  Admit Date: 3/22/2017       Hospital Day: 1  Referring Provider: Pato Alfred MD      Patient Seen, Chart, Consults, Notes, Labs, Radiology studies reviewed.        Subjective:  Nicolas Roberts is a 72 y.o. male  whom we were consulted for end stage renal disease management.  Hemodialysis Monday Wednesday Friday.  Patient admitted for progressive weakness and exacerbation of COPD.  No N/V/D.  Currently resting comfortably in bed, no dyspnea at rest but has significant shortness of  breath with any exertion.    Allergies:  Contrast dye; Promethazine hcl; and Hydrocodone-acetaminophen    Home Meds:  Prescriptions Prior to Admission   Medication Sig Dispense Refill Last Dose   • albuterol (PROVENTIL HFA;VENTOLIN HFA) 108 (90 BASE) MCG/ACT inhaler Inhale 1 puff Every 4 (Four) Hours As Needed for Wheezing.   3/22/2017 at 0800   • albuterol (PROVENTIL) (2.5 MG/3ML) 0.083% nebulizer solution Take 2.5 mg by nebulization Every 6 (Six) Hours As Needed for Wheezing or Shortness of Air.   3/22/2017 at 0800   • allopurinol (ZYLOPRIM) 100 MG tablet Take 100 mg by mouth 2 (Two) Times a Day.   3/22/2017 at 0800   • amLODIPine (NORVASC) 5 MG tablet Take 5 mg by mouth 2 (Two) Times a Day.   3/21/2017 at 0800   • atorvastatin (LIPITOR) 80 MG tablet Take 80 mg by mouth Every Night.   3/21/2017 at 2000   • B Complex-C-Folic Acid (JABIER-ARIS PO) Take 1 tablet by mouth Daily.   Past Week at Unknown time   • clopidogrel (PLAVIX) 75 MG tablet Take 75 mg by mouth Daily.   3/22/2017 at 0800   • Ferric Citrate (AURYXIA) 1  MG(FE) tablet Take 1 tablet by mouth Every Night.   3/21/2017 at 2000   • Insulin Glargine (LANTUS SOLOSTAR) 100 UNIT/ML injection pen Inject 10 Units under the skin Every Night.   3/20/2017 at 2000   • metoprolol tartrate (LOPRESSOR) 100 MG tablet Take 100 mg by mouth Daily.   3/22/2017 at 0800   • nitroglycerin (NITROSTAT) 0.4 MG SL tablet Place 0.4 mg under the tongue Every 5 (Five) Minutes As Needed for chest pain. Take no more than 3 doses in 15 minutes.   Past Month at Unknown time   • omeprazole (PriLOSEC) 20 MG capsule Take 20 mg by mouth 2 (Two) Times a Day.   3/22/2017 at 0800   • ramipril (ALTACE) 10 MG capsule Take 10 mg by mouth 2 (Two) Times a Day.   3/22/2017 at 0800       Medicines:  Current Facility-Administered Medications   Medication Dose Route Frequency Provider Last Rate Last Dose   • acetaminophen (TYLENOL) tablet 650 mg  650 mg Oral Q6H PRN Pato Alfred MD       •  amLODIPine (NORVASC) tablet 5 mg  5 mg Oral Q12H Pato Alfred MD   5 mg at 03/23/17 0838   • atorvastatin (LIPITOR) tablet 80 mg  80 mg Oral Nightly Pato Alfred MD   80 mg at 03/22/17 2338   • CloNIDine (CATAPRES) tablet 0.1 mg  0.1 mg Oral Q6H PRN Pato Alfred MD       • clopidogrel (PLAVIX) tablet 75 mg  75 mg Oral Daily Pato Alfred MD   75 mg at 03/23/17 0838   • dextrose (D50W) solution 25 g  25 g Intravenous Q15 Min PRN Pato Alfred MD       • dextrose (GLUTOSE) oral gel 15 g  15 g Oral Q15 Min PRN Pato Alfred MD       • glucagon (human recombinant) (GLUCAGEN DIAGNOSTIC) injection 1 mg  1 mg Subcutaneous Q15 Min PRN Pato Alfred MD       • heparin (porcine) 5000 UNIT/ML injection 5,000 Units  5,000 Units Subcutaneous Q12H Pato Alfred MD   5,000 Units at 03/23/17 0835   • insulin detemir (LEVEMIR) injection 10 Units  10 Units Subcutaneous Nightly Pato Alfred MD   10 Units at 03/22/17 2252   • insulin lispro (humaLOG) injection 0-9 Units  0-9 Units Subcutaneous 4x Daily AC & at Bedtime Pato Alfred MD   2 Units at 03/23/17 0835   • ipratropium-albuterol (DUO-NEB) nebulizer solution 3 mL  3 mL Nebulization Q4H PRN Pato Alfred MD       • [START ON 3/24/2017] levoFLOXacin (LEVAQUIN) 250 mg/50 mL D5W (premix) 250 mg  250 mg Intravenous Q48H MARIA LUISA [facility]       • methylPREDNISolone sodium succinate (SOLU-Medrol) injection 60 mg  60 mg Intravenous Q12H Pato Alfred MD   60 mg at 03/23/17 0615   • metoprolol tartrate (LOPRESSOR) tablet 50 mg  50 mg Oral Q12H Pato Alfred MD   50 mg at 03/23/17 0838   • nitroglycerin (NITROSTAT) SL tablet 0.4 mg  0.4 mg Sublingual Q5 Min PRN Pato Alfred MD       • ondansetron (ZOFRAN) injection 4 mg  4 mg Intravenous Q6H PRN Pato Alfred MD       • pantoprazole (PROTONIX) EC tablet 40 mg  40 mg Oral Q AM Pato Alfred MD   40 mg at 03/23/17 0618   • ramipril (ALTACE) capsule 10 mg  10 mg Oral Q12H Pato Alfred MD   10 mg at 03/23/17 0837   • sodium chloride 0.9  % flush 1-10 mL  1-10 mL Intravenous PRN Pato Alfred MD       • sodium chloride 0.9 % flush 10 mL  10 mL Intravenous PRN Zackary Dahl Jr., MD           Past Medical History:  Past Medical History:   Diagnosis Date   • CAD (coronary artery disease)    • CAD in native artery    • Cardiac device in situ      in stent restenosis 2/10 RCA   • Carotid atherosclerosis    • Carotid bruit    • Diabetes mellitus     DM, UNCOMPLICATED, TYPE II, UNCONTROLLED   • History of PTCA    • Hyperlipidemia    • Hypertension    • Pacemaker     does not have  a pacemaker    • Peripheral vascular disease        Past Surgical History:  Past Surgical History:   Procedure Laterality Date   • ARTERIOVENOUS FISTULA     • CARDIAC CATHETERIZATION Left 02/18/2010    Stent to RCA   • CAROTID STENT      RCA       Family History  Family History   Problem Relation Age of Onset   • Heart attack Father        Social History  Social History     Social History   • Marital status:      Spouse name: N/A   • Number of children: N/A   • Years of education: N/A     Occupational History   • Not on file.     Social History Main Topics   • Smoking status: Former Smoker   • Smokeless tobacco: Not on file      Comment: quit 30 years ago    • Alcohol use 1.2 oz/week     2 Standard drinks or equivalent per week   • Drug use: No   • Sexual activity: Defer     Other Topics Concern   • Not on file     Social History Narrative         Review of Systems:  History obtained from chart review and the patient  General ROS: Patient complains of fatigue and No fever or chills  Respiratory ROS: negative for - cough or wheezing  Cardiovascular ROS: positive for - dyspnea on exertion  negative for - chest pain  Gastrointestinal ROS: No abdominal pain or melena  Genito-Urinary ROS: No dysuria or hematuria  14 point ROS reviewed with the patient and negative except as noted above and in the HPI unless unable to obtain.    Objective:  /59  Pulse 66  Temp 98.1 °F  "(36.7 °C)  Resp 18  Ht 67\" (170.2 cm)  Wt 113 lb (51.3 kg)  SpO2 99%  BMI 17.7 kg/m2    Intake/Output Summary (Last 24 hours) at 03/23/17 1157  Last data filed at 03/22/17 2208   Gross per 24 hour   Intake              100 ml   Output                0 ml   Net              100 ml     General: awake/alert   Chest:  clear to auscultation bilaterally without respiratory distress  CVS: regular rate and rhythm  Abdominal: soft, nontender, normal bowel sounds  Extremities: no cyanosis or edema  Skin: warm and dry without rash      Labs:    Results from last 7 days  Lab Units 03/23/17  0459 03/22/17  1738   WBC 10*3/mm3 5.37 9.35   HEMOGLOBIN g/dL 10.2* 11.5*   HEMATOCRIT % 31.8* 35.7*   PLATELETS 10*3/mm3 201 206           Results from last 7 days  Lab Units 03/23/17  0459 03/22/17  1738   SODIUM mmol/L 140 141   POTASSIUM mmol/L 4.2 3.8   CHLORIDE mmol/L 94* 94*   TOTAL CO2 mmol/L 34.0* 37.0*   BUN mg/dL 33* 18   CREATININE mg/dL 2.63* 1.85*   CALCIUM mg/dL 9.1 9.5   BILIRUBIN mg/dL  --  0.6   ALK PHOS U/L  --  101   ALT (SGPT) U/L  --  48   AST (SGOT) U/L  --  51*   GLUCOSE mg/dL 184* 137*       Radiology:   Imaging Results (last 72 hours)     Procedure Component Value Units Date/Time    XR Chest 1 View [[id number]] Collected:  03/22/17 1807     Updated:  03/1945    Narrative:       EXAMINATION:  XR CHEST 1 VW- 3/22/2017 5:21 PM CDT     HISTORY: Short of breath.     A single view of the chest is obtained. A right pleural effusion is  present. A small left pleural effusion is present.     A right perihilar interstitial infiltrate is present. This has the  appearance of pulmonary vascular congestion. A right internal jugular  central venous catheter is present, unchanged from 12/03/2016.       Impression:       Vascular congestion with cardiomegaly and bilateral pleural effusions.  This report was finalized on 03/22/2017 19:40 by Dr. Herson Coe MD.          Culture:  Blood Culture   Date Value Ref Range " Status   03/22/2017 No growth at less than 24 hours  Preliminary   03/22/2017 No growth at less than 24 hours  Preliminary         Assessment   1.  End stage renal disease on hemodialysis Monday Wednesday Friday  2.  Acute exacerbation of chronic obstructive pulmonary disease  3.  Chronic congestive heart failure  4.  Anemia of chronic disease  5.  Hypertension  6.  Diabetes mellitus type 2    Plan:  1.  Maintenance dialysis MWF  2.  Further assessment/plan following Dr Phipps's evaluation of patient      Thank you for the consult, we appreciate the opportunity to provide care to your patients.  Feel free to contact me if I can be of any further assistance.      Rob Crowder, APRN  3/23/2017  11:57 AM     Electronically signed by Blaine Phipps MD at 3/23/2017  8:24 PM           Physical Therapy Notes (most recent note)      Jacob Aguero PTA at 3/25/2017  3:23 PM  Version 1 of 1         Problem: Patient Care Overview (Adult)  Goal: Plan of Care Review  Outcome: Ongoing (interventions implemented as appropriate)    03/25/17 1502   Coping/Psychosocial Response Interventions   Plan Of Care Reviewed With patient   Patient Care Overview   Progress progress towards functional goals is fair   Outcome Evaluation   Outcome Summary/Follow up Plan Pt transfered supine to sit with SBA. Sit to stand with CGA.. Amb 150' with RWX on 2.0 L O2 CGA, X 2 with one standing rest. Will continue to work with pt to increase strength and endurance              Electronically signed by Jacob Aguero PTA at 3/25/2017  3:23 PM        Respiratory Therapy Notes (most recent note)     No notes of this type exist for this encounter.

## 2017-03-26 NOTE — PROGRESS NOTES
Nephrology ([facility]) Consult Note      Patient:  Nicolas Roberts  YOB: 1945  Date of Service: 3/26/2017  MRN: [id number]   Acct: [id number]   Primary Care Physician: Rafita Harris MD  Advance Directive: Full Code  Admit Date: 3/22/2017       Hospital Day: 4  Referring Provider: Pato Alfred MD      Patient Seen, Chart, Consults, Notes, Labs, Radiology studies reviewed.        Subjective:  Nicolas Roberts is a 72 y.o. male  whom we were consulted for end stage renal disease management. Hemodialysis Monday Wednesday Friday. Patient admitted for progressive weakness and exacerbation of COPD. No N/V/D. No dyspnea at rest but has had significant shortness of breath with any exertion.     Feeling well today.  Issues with hypoglycemia overnight and AM of 3/25, now higher today.         Allergies:  Contrast dye; Promethazine hcl; and Hydrocodone-acetaminophen    Home Meds:  Prescriptions Prior to Admission   Medication Sig Dispense Refill Last Dose   • albuterol (PROVENTIL HFA;VENTOLIN HFA) 108 (90 BASE) MCG/ACT inhaler Inhale 1 puff Every 4 (Four) Hours As Needed for Wheezing.   3/22/2017 at 0800   • albuterol (PROVENTIL) (2.5 MG/3ML) 0.083% nebulizer solution Take 2.5 mg by nebulization Every 6 (Six) Hours As Needed for Wheezing or Shortness of Air.   3/22/2017 at 0800   • allopurinol (ZYLOPRIM) 100 MG tablet Take 100 mg by mouth 2 (Two) Times a Day.   3/22/2017 at 0800   • amLODIPine (NORVASC) 5 MG tablet Take 5 mg by mouth 2 (Two) Times a Day.   3/21/2017 at 0800   • atorvastatin (LIPITOR) 80 MG tablet Take 80 mg by mouth Every Night.   3/21/2017 at 2000   • B Complex-C-Folic Acid (JABIER-ARIS PO) Take 1 tablet by mouth Daily.   Past Week at Unknown time   • clopidogrel (PLAVIX) 75 MG tablet Take 75 mg by mouth Daily.   3/22/2017 at 0800   • Ferric Citrate (AURYXIA) 1  MG(FE) tablet Take 1 tablet by mouth Every Night.   3/21/2017 at 2000   • Insulin Glargine (LANTUS  SOLOSTAR) 100 UNIT/ML injection pen Inject 10 Units under the skin Every Night.   3/20/2017 at 2000   • metoprolol tartrate (LOPRESSOR) 100 MG tablet Take 100 mg by mouth Daily.   3/22/2017 at 0800   • nitroglycerin (NITROSTAT) 0.4 MG SL tablet Place 0.4 mg under the tongue Every 5 (Five) Minutes As Needed for chest pain. Take no more than 3 doses in 15 minutes.   Past Month at Unknown time   • omeprazole (PriLOSEC) 20 MG capsule Take 20 mg by mouth 2 (Two) Times a Day.   3/22/2017 at 0800   • ramipril (ALTACE) 10 MG capsule Take 10 mg by mouth 2 (Two) Times a Day.   3/22/2017 at 0800       Medicines:  Current Facility-Administered Medications   Medication Dose Route Frequency Provider Last Rate Last Dose   • acetaminophen (TYLENOL) tablet 650 mg  650 mg Oral Q6H PRN Pato Alfred MD       • amLODIPine (NORVASC) tablet 5 mg  5 mg Oral Q12H Pato Alfred MD   5 mg at 03/26/17 0925   • atorvastatin (LIPITOR) tablet 80 mg  80 mg Oral Nightly Pato Alfred MD   80 mg at 03/25/17 2152   • budesonide-formoterol (SYMBICORT) 160-4.5 MCG/ACT inhaler 2 puff  2 puff Inhalation BID - RT Tiburcio Maguire MD   2 puff at 03/26/17 0726   • CloNIDine (CATAPRES) tablet 0.1 mg  0.1 mg Oral Q6H PRN Pato Alfred MD       • clopidogrel (PLAVIX) tablet 75 mg  75 mg Oral Daily Pato Alfred MD   75 mg at 03/26/17 0925   • dextrose (D50W) solution 25 g  25 g Intravenous Q15 Min PRN Pato Alfred MD   20 g at 03/25/17 0656   • dextrose (GLUTOSE) oral gel 15 g  15 g Oral Q15 Min PRN Pato Alfred MD       • diazePAM (VALIUM) tablet 2 mg  2 mg Oral Q8H PRN Ruth Norman MD   2 mg at 03/24/17 2237   • docusate sodium (COLACE) capsule 100 mg  100 mg Oral BID CHARO Zayas   100 mg at 03/26/17 0925   • glucagon (human recombinant) (GLUCAGEN DIAGNOSTIC) injection 1 mg  1 mg Subcutaneous Q15 Min PRN Pato Alfred MD       • heparin (porcine) 5000 UNIT/ML injection 5,000 Units  5,000 Units Subcutaneous Q12H Pato Alfred MD   5,000  Units at 03/26/17 0925   • heparin (porcine) injection 1,800 Units  1,800 Units Intracatheter PRN Blaine Phipps MD   1,800 Units at 03/24/17 1231   • heparin (porcine) injection 1,800 Units  1,800 Units Intracatheter PRN Blaine Phipps MD   1,800 Units at 03/24/17 1230   • insulin lispro (humaLOG) injection 0-9 Units  0-9 Units Subcutaneous 4x Daily AC & at Bedtime Pato Alfred MD   4 Units at 03/26/17 1236   • ipratropium-albuterol (DUO-NEB) nebulizer solution 3 mL  3 mL Nebulization Q4H PRN Pato Alfred MD       • levoFLOXacin (LEVAQUIN) tablet 250 mg  250 mg Oral Every Other Day Tiburcio Maguire MD   250 mg at 03/24/17 1757   • metoprolol tartrate (LOPRESSOR) tablet 50 mg  50 mg Oral Q12H Pato Alfred MD   50 mg at 03/26/17 0925   • nitroglycerin (NITROSTAT) SL tablet 0.4 mg  0.4 mg Sublingual Q5 Min PRN Pato Alfred MD       • ondansetron (ZOFRAN) injection 4 mg  4 mg Intravenous Q6H PRN Pato Alfred MD       • pantoprazole (PROTONIX) EC tablet 40 mg  40 mg Oral Q AM Pato Alfred MD   40 mg at 03/26/17 0610   • predniSONE (DELTASONE) tablet 20 mg  20 mg Oral Daily With Breakfast Tiburcio Maguire MD   20 mg at 03/26/17 0925   • sodium chloride 0.9 % flush 1-10 mL  1-10 mL Intravenous PRN Pato Alfred MD       • sodium chloride 0.9 % flush 10 mL  10 mL Intravenous PRN Zackary Dahl Jr., MD           Past Medical History:  Past Medical History:   Diagnosis Date   • CAD (coronary artery disease)    • CAD in native artery    • Cardiac device in situ      in stent restenosis 2/10 RCA   • Carotid atherosclerosis    • Carotid bruit    • Diabetes mellitus     DM, UNCOMPLICATED, TYPE II, UNCONTROLLED   • History of PTCA    • Hyperlipidemia    • Hypertension    • Pacemaker     does not have  a pacemaker    • Peripheral vascular disease        Past Surgical History:  Past Surgical History:   Procedure Laterality Date   • ARTERIOVENOUS FISTULA     • CARDIAC CATHETERIZATION Left  "02/18/2010    Stent to RCA   • CAROTID STENT      RCA       Family History  Family History   Problem Relation Age of Onset   • Heart attack Father        Social History  Social History     Social History   • Marital status:      Spouse name: N/A   • Number of children: N/A   • Years of education: N/A     Occupational History   • Not on file.     Social History Main Topics   • Smoking status: Former Smoker   • Smokeless tobacco: Not on file      Comment: quit 30 years ago    • Alcohol use 1.2 oz/week     2 Standard drinks or equivalent per week   • Drug use: No   • Sexual activity: Defer     Other Topics Concern   • Not on file     Social History Narrative         Review of Systems:  History obtained from chart review and the patient  General ROS: No fever or chills  Respiratory ROS: No cough, +shortness of breath  Cardiovascular ROS: no chest pain or dyspnea on exertion  Gastrointestinal ROS: No abdominal pain or melena  Genito-Urinary ROS: No dysuria or hematuria  14 point ROS reviewed with the patient and negative except as noted above and in the HPI unless unable to obtain.    Objective:  /57 (BP Location: Right arm, Patient Position: Lying)  Pulse 62  Temp 98.3 °F (36.8 °C) (Tympanic)   Resp 18  Ht 67\" (170.2 cm)  Wt 125 lb 12.8 oz (57.1 kg)  SpO2 99%  BMI 19.7 kg/m2    Intake/Output Summary (Last 24 hours) at 03/26/17 1745  Last data filed at 03/26/17 1300   Gross per 24 hour   Intake              960 ml   Output                0 ml   Net              960 ml     General: awake/alert   Chest:  Long exp phase otherwise cta  CVS: regular rate and rhythm  Abdominal: soft, nontender, normal bowel sounds  Extremities: no cyanosis or edema  Skin: warm and dry without rash      Labs:    Results from last 7 days  Lab Units 03/26/17  0711 03/25/17  0526 03/24/17  0602   WBC 10*3/mm3 9.39 10.81* 13.71*   HEMOGLOBIN g/dL 11.0* 9.7* 10.2*   HEMATOCRIT % 33.1* 30.3* 31.4*   PLATELETS 10*3/mm3 210 216 221 "           Results from last 7 days  Lab Units 03/26/17  0544 03/25/17  0526 03/24/17  0602  03/22/17  1738   SODIUM mmol/L 130* 134* 136  < > 141   POTASSIUM mmol/L 4.7 4.2 4.8  < > 3.8   CHLORIDE mmol/L 90* 93* 94*  < > 94*   TOTAL CO2 mmol/L 29.0 32.0* 31.0  < > 37.0*   BUN mg/dL 70* 43* 65*  < > 18   CREATININE mg/dL 4.00* 2.66* 3.71*  < > 1.85*   CALCIUM mg/dL 8.9 8.6 9.3  < > 9.5   BILIRUBIN mg/dL  --   --   --   --  0.6   ALK PHOS U/L  --   --   --   --  101   ALT (SGPT) U/L  --   --   --   --  48   AST (SGOT) U/L  --   --   --   --  51*   GLUCOSE mg/dL 169* 41* 136*  < > 137*   < > = values in this interval not displayed.    Radiology:   Imaging Results (last 72 hours)     Procedure Component Value Units Date/Time    XR Chest 1 View [[id number]] Collected:  03/22/17 1807     Updated:  03/1945    Narrative:       EXAMINATION:  XR CHEST 1 VW- 3/22/2017 5:21 PM CDT     HISTORY: Short of breath.     A single view of the chest is obtained. A right pleural effusion is  present. A small left pleural effusion is present.     A right perihilar interstitial infiltrate is present. This has the  appearance of pulmonary vascular congestion. A right internal jugular  central venous catheter is present, unchanged from 12/03/2016.       Impression:       Vascular congestion with cardiomegaly and bilateral pleural effusions.  This report was finalized on 03/22/2017 19:40 by Dr. Herson Coe MD.    XR Chest PA & Lateral [[id number]] Collected:  03/25/17 0942     Updated:  03/25/17 1612    Narrative:       Frontal supine radiograph of the chest 3/25/2017 7:39 CST     History: dyspnea; J44.1-Chronic obstructive pulmonary disease with  (acute) exacerbation     Comparison: Chest x-ray dated 03/22/2017      Findings:   Lines and tubes are stable in position. No new opacities or  pneumothoraces are visualized in the chest. The cardiomediastinal  silhouette and pulmonary vascularity are unchanged.       No acute osseous or soft  tissue abnormality is noted.        Impression:       Impression:    No significant interval change since previous exam including the  bilateral pleural effusions and associated opacity.        This report was finalized on 03/25/2017 16:09 by Dr. Gabriella Jacob MD.          Culture:  Blood Culture   Date Value Ref Range Status   03/22/2017 No growth at 2 days  Preliminary   03/22/2017 No growth at 2 days  Preliminary         Assessment   1. End stage renal disease on hemodialysis Monday Wednesday Friday  2. Acute exacerbation of chronic obstructive pulmonary disease  3. Chronic congestive heart failure  4. Anemia of chronic disease  5. Hypertension  6. Diabetes mellitus type 2      Plan:  HD MWF  Monitor labs  Insulin adjusted for hypoglycemia  Planning [facility], now at [facility]    Blaine Phipps MD  3/26/2017  5:45 PM

## 2017-03-26 NOTE — PLAN OF CARE
Problem: COPD, Chronic Bronchitis/Emphysema (Adult)  Goal: Signs and Symptoms of Listed Potential Problems Will be Absent or Manageable (COPD, Chronic Bronchitis/Emphysema)  Outcome: Ongoing (interventions implemented as appropriate)    03/25/17 1622 03/26/17 0256   COPD, Chronic Bronchitis/Emphysema   Problems Assessed (COPD, Chronic Bronchitis/Emphysema) all --    Problems Present (COPD, Chronic Bronchitis/Emphysema) --  dyspnea         Problem: Fall Risk (Adult)  Goal: Absence of Falls  Outcome: Ongoing (interventions implemented as appropriate)    03/26/17 0256   Fall Risk (Adult)   Absence of Falls achieves outcome

## 2017-03-27 ENCOUNTER — APPOINTMENT (OUTPATIENT)
Dept: GENERAL RADIOLOGY | Facility: HOSPITAL | Age: 72
End: 2017-03-27

## 2017-03-27 LAB
ANION GAP SERPL CALCULATED.3IONS-SCNC: 13 MMOL/L (ref 4–13)
BACTERIA SPEC AEROBE CULT: NORMAL
BACTERIA SPEC AEROBE CULT: NORMAL
BUN BLD-MCNC: 86 MG/DL (ref 5–21)
BUN/CREAT SERPL: 18.5 (ref 7–25)
CALCIUM SPEC-SCNC: 8.4 MG/DL (ref 8.4–10.4)
CHLORIDE SERPL-SCNC: 90 MMOL/L (ref 98–110)
CO2 SERPL-SCNC: 26 MMOL/L (ref 24–31)
CREAT BLD-MCNC: 4.66 MG/DL (ref 0.5–1.4)
DEPRECATED RDW RBC AUTO: 46.4 FL (ref 40–54)
ERYTHROCYTE [DISTWIDTH] IN BLOOD BY AUTOMATED COUNT: 13.7 % (ref 12–15)
GFR SERPL CREATININE-BSD FRML MDRD: 12 ML/MIN/1.73
GLUCOSE BLD-MCNC: 204 MG/DL (ref 70–100)
GLUCOSE BLDC GLUCOMTR-MCNC: 134 MG/DL (ref 70–130)
GLUCOSE BLDC GLUCOMTR-MCNC: 164 MG/DL (ref 70–130)
GLUCOSE BLDC GLUCOMTR-MCNC: 186 MG/DL (ref 70–130)
GLUCOSE BLDC GLUCOMTR-MCNC: 227 MG/DL (ref 70–130)
HCT VFR BLD AUTO: 29.7 % (ref 40–52)
HGB BLD-MCNC: 10.1 G/DL (ref 14–18)
MCH RBC QN AUTO: 31.4 PG (ref 28–32)
MCHC RBC AUTO-ENTMCNC: 34 G/DL (ref 33–36)
MCV RBC AUTO: 92.2 FL (ref 82–95)
PLATELET # BLD AUTO: 199 10*3/MM3 (ref 130–400)
PMV BLD AUTO: 10 FL (ref 6–12)
POTASSIUM BLD-SCNC: 4.9 MMOL/L (ref 3.5–5.3)
RBC # BLD AUTO: 3.22 10*6/MM3 (ref 4.8–5.9)
SODIUM BLD-SCNC: 129 MMOL/L (ref 135–145)
WBC NRBC COR # BLD: 8.79 10*3/MM3 (ref 4.8–10.8)

## 2017-03-27 PROCEDURE — 97110 THERAPEUTIC EXERCISES: CPT

## 2017-03-27 PROCEDURE — 80048 BASIC METABOLIC PNL TOTAL CA: CPT | Performed by: NURSE PRACTITIONER

## 2017-03-27 PROCEDURE — 97530 THERAPEUTIC ACTIVITIES: CPT

## 2017-03-27 PROCEDURE — 94660 CPAP INITIATION&MGMT: CPT

## 2017-03-27 PROCEDURE — 82962 GLUCOSE BLOOD TEST: CPT

## 2017-03-27 PROCEDURE — 94799 UNLISTED PULMONARY SVC/PX: CPT

## 2017-03-27 PROCEDURE — 85027 COMPLETE CBC AUTOMATED: CPT | Performed by: NURSE PRACTITIONER

## 2017-03-27 PROCEDURE — 71010 HC CHEST PA OR AP: CPT

## 2017-03-27 PROCEDURE — 25010000002 HEPARIN (PORCINE) PER 1000 UNITS: Performed by: INTERNAL MEDICINE

## 2017-03-27 PROCEDURE — 94640 AIRWAY INHALATION TREATMENT: CPT

## 2017-03-27 PROCEDURE — 97116 GAIT TRAINING THERAPY: CPT

## 2017-03-27 RX ORDER — PREDNISONE 10 MG/1
10 TABLET ORAL
Status: DISCONTINUED | OUTPATIENT
Start: 2017-03-28 | End: 2017-03-28 | Stop reason: HOSPADM

## 2017-03-27 RX ORDER — MEGESTROL ACETATE 40 MG/1
40 TABLET ORAL DAILY
Status: DISCONTINUED | OUTPATIENT
Start: 2017-03-27 | End: 2017-03-28 | Stop reason: HOSPADM

## 2017-03-27 RX ORDER — HEPARIN SODIUM 1000 [USP'U]/ML
4000 INJECTION, SOLUTION INTRAVENOUS; SUBCUTANEOUS ONCE
Status: DISCONTINUED | OUTPATIENT
Start: 2017-03-27 | End: 2017-03-28 | Stop reason: HOSPADM

## 2017-03-27 RX ADMIN — HEPARIN SODIUM 5000 UNITS: 5000 INJECTION, SOLUTION INTRAVENOUS; SUBCUTANEOUS at 12:12

## 2017-03-27 RX ADMIN — AMLODIPINE BESYLATE 5 MG: 5 TABLET ORAL at 21:36

## 2017-03-27 RX ADMIN — SODIUM CHLORIDE 250 ML: 9 INJECTION, SOLUTION INTRAVENOUS at 16:03

## 2017-03-27 RX ADMIN — INSULIN LISPRO 4 UNITS: 100 INJECTION, SOLUTION INTRAVENOUS; SUBCUTANEOUS at 21:40

## 2017-03-27 RX ADMIN — PANTOPRAZOLE SODIUM 40 MG: 40 TABLET, DELAYED RELEASE ORAL at 05:35

## 2017-03-27 RX ADMIN — BUDESONIDE AND FORMOTEROL FUMARATE DIHYDRATE 2 PUFF: 160; 4.5 AEROSOL RESPIRATORY (INHALATION) at 07:10

## 2017-03-27 RX ADMIN — DOCUSATE SODIUM 100 MG: 100 CAPSULE ORAL at 12:11

## 2017-03-27 RX ADMIN — DESMOPRESSIN ACETATE 80 MG: 0.2 TABLET ORAL at 21:36

## 2017-03-27 RX ADMIN — HEPARIN SODIUM 5000 UNITS: 5000 INJECTION, SOLUTION INTRAVENOUS; SUBCUTANEOUS at 21:38

## 2017-03-27 RX ADMIN — CLOPIDOGREL BISULFATE 75 MG: 75 TABLET, FILM COATED ORAL at 12:12

## 2017-03-27 RX ADMIN — BUDESONIDE AND FORMOTEROL FUMARATE DIHYDRATE 2 PUFF: 160; 4.5 AEROSOL RESPIRATORY (INHALATION) at 19:30

## 2017-03-27 RX ADMIN — DOCUSATE SODIUM 100 MG: 100 CAPSULE ORAL at 17:33

## 2017-03-27 RX ADMIN — INSULIN LISPRO 2 UNITS: 100 INJECTION, SOLUTION INTRAVENOUS; SUBCUTANEOUS at 17:33

## 2017-03-27 RX ADMIN — METOPROLOL TARTRATE 50 MG: 50 TABLET ORAL at 21:37

## 2017-03-27 RX ADMIN — MEGESTROL ACETATE 40 MG: 40 TABLET ORAL at 17:33

## 2017-03-27 NOTE — PLAN OF CARE
Problem: Patient Care Overview (Adult)  Goal: Plan of Care Review  Outcome: Ongoing (interventions implemented as appropriate)    03/27/17 1801   Coping/Psychosocial Response Interventions   Plan Of Care Reviewed With patient;spouse   Patient Care Overview   Progress no change   Outcome Evaluation   Outcome Summary/Follow up Plan VSS at this time, had episode of symptomatic hypotension that was corrected after a 250ml NS bolus x1, MD aware, pt tolerated dialysis well aside from the hypotension, pt otherwise has had no further complaints, has no further questions at this time, will continue to monitor.        Goal: Adult Individualization and Mutuality  Outcome: Ongoing (interventions implemented as appropriate)  Goal: Discharge Needs Assessment  Outcome: Ongoing (interventions implemented as appropriate)    Problem: COPD, Chronic Bronchitis/Emphysema (Adult)  Goal: Signs and Symptoms of Listed Potential Problems Will be Absent or Manageable (COPD, Chronic Bronchitis/Emphysema)  Outcome: Ongoing (interventions implemented as appropriate)    Problem: Fall Risk (Adult)  Goal: Absence of Falls  Outcome: Ongoing (interventions implemented as appropriate)    Problem: Diabetes, Type 2 (Adult)  Goal: Signs and Symptoms of Listed Potential Problems Will be Absent or Manageable (Diabetes, Type 2)  Outcome: Ongoing (interventions implemented as appropriate)

## 2017-03-27 NOTE — PROGRESS NOTES
Nephrology ([facility]) Progess Note      Patient:  Nicolas Roberts  YOB: 1945  Date of Service: 3/27/2017  MRN: [id number]   Acct: [id number]   Primary Care Physician: Rafita Harris MD  Advance Directive: Full Code  Admit Date: 3/22/2017       Hospital Day: 5  Referring Provider: Pato Alfred MD      Patient Seen, Chart, Consults, Notes, Labs, Radiology studies reviewed.        Subjective:  Nicolas Roberts is a 72 y.o. male  whom we were consulted for end stage renal disease management. Hemodialysis Monday Wednesday Friday. Admitted for progressive weakness and exacerbation of COPD.  No N/V/D.  No dyspnea at rest but has had significant shortness of breath with any exertion.  No new issues today, working on nursing home placement in preporation for discharge.       Allergies:  Contrast dye; Promethazine hcl; and Hydrocodone-acetaminophen    Home Meds:  Prescriptions Prior to Admission   Medication Sig Dispense Refill Last Dose   • albuterol (PROVENTIL HFA;VENTOLIN HFA) 108 (90 BASE) MCG/ACT inhaler Inhale 1 puff Every 4 (Four) Hours As Needed for Wheezing.   3/22/2017 at 0800   • albuterol (PROVENTIL) (2.5 MG/3ML) 0.083% nebulizer solution Take 2.5 mg by nebulization Every 6 (Six) Hours As Needed for Wheezing or Shortness of Air.   3/22/2017 at 0800   • allopurinol (ZYLOPRIM) 100 MG tablet Take 100 mg by mouth 2 (Two) Times a Day.   3/22/2017 at 0800   • amLODIPine (NORVASC) 5 MG tablet Take 5 mg by mouth 2 (Two) Times a Day.   3/21/2017 at 0800   • atorvastatin (LIPITOR) 80 MG tablet Take 80 mg by mouth Every Night.   3/21/2017 at 2000   • B Complex-C-Folic Acid (JABIER-ARIS PO) Take 1 tablet by mouth Daily.   Past Week at Unknown time   • clopidogrel (PLAVIX) 75 MG tablet Take 75 mg by mouth Daily.   3/22/2017 at 0800   • Ferric Citrate (AURYXIA) 1  MG(FE) tablet Take 1 tablet by mouth Every Night.   3/21/2017 at 2000   • Insulin Glargine (LANTUS SOLOSTAR) 100  UNIT/ML injection pen Inject 10 Units under the skin Every Night.   3/20/2017 at 2000   • metoprolol tartrate (LOPRESSOR) 100 MG tablet Take 100 mg by mouth Daily.   3/22/2017 at 0800   • nitroglycerin (NITROSTAT) 0.4 MG SL tablet Place 0.4 mg under the tongue Every 5 (Five) Minutes As Needed for chest pain. Take no more than 3 doses in 15 minutes.   Past Month at Unknown time   • omeprazole (PriLOSEC) 20 MG capsule Take 20 mg by mouth 2 (Two) Times a Day.   3/22/2017 at 0800   • ramipril (ALTACE) 10 MG capsule Take 10 mg by mouth 2 (Two) Times a Day.   3/22/2017 at 0800       Medicines:  Current Facility-Administered Medications   Medication Dose Route Frequency Provider Last Rate Last Dose   • acetaminophen (TYLENOL) tablet 650 mg  650 mg Oral Q6H PRN Pato Alfred MD       • amLODIPine (NORVASC) tablet 5 mg  5 mg Oral Q12H Pato Alfred MD   5 mg at 03/26/17 2115   • atorvastatin (LIPITOR) tablet 80 mg  80 mg Oral Nightly Pato Alfred MD   80 mg at 03/26/17 2114   • budesonide-formoterol (SYMBICORT) 160-4.5 MCG/ACT inhaler 2 puff  2 puff Inhalation BID - RT Tiburcio Maguire MD   2 puff at 03/27/17 0710   • CloNIDine (CATAPRES) tablet 0.1 mg  0.1 mg Oral Q6H PRN Pato Alfred MD       • clopidogrel (PLAVIX) tablet 75 mg  75 mg Oral Daily Pato Alfred MD   75 mg at 03/26/17 0925   • dextrose (D50W) solution 25 g  25 g Intravenous Q15 Min PRN Pato Alfred MD   20 g at 03/25/17 0656   • dextrose (GLUTOSE) oral gel 15 g  15 g Oral Q15 Min PRN Pato Alfred MD       • diazePAM (VALIUM) tablet 2 mg  2 mg Oral Q8H PRN Ruth Norman MD   2 mg at 03/24/17 6317   • docusate sodium (COLACE) capsule 100 mg  100 mg Oral BID CHARO Zayas   100 mg at 03/26/17 1757   • glucagon (human recombinant) (GLUCAGEN DIAGNOSTIC) injection 1 mg  1 mg Subcutaneous Q15 Min PRN Pato Alfred MD       • heparin (porcine) 5000 UNIT/ML injection 5,000 Units  5,000 Units Subcutaneous Q12H Pato Alfred MD   5,000 Units at 03/26/17  2115   • heparin (porcine) injection 1,800 Units  1,800 Units Intracatheter PRN Blaine Phipps MD   1,800 Units at 03/24/17 1231   • heparin (porcine) injection 1,800 Units  1,800 Units Intracatheter PRN Blaine Phipps MD   1,800 Units at 03/24/17 1230   • insulin lispro (humaLOG) injection 0-9 Units  0-9 Units Subcutaneous 4x Daily AC & at Bedtime Pato Alfred MD   4 Units at 03/26/17 2115   • ipratropium-albuterol (DUO-NEB) nebulizer solution 3 mL  3 mL Nebulization Q4H PRN Pato Alfred MD       • levoFLOXacin (LEVAQUIN) tablet 250 mg  250 mg Oral Every Other Day Tiburcio Maguire MD   250 mg at 03/26/17 1757   • metoprolol tartrate (LOPRESSOR) tablet 50 mg  50 mg Oral Q12H Pato Alfred MD   50 mg at 03/26/17 2114   • nitroglycerin (NITROSTAT) SL tablet 0.4 mg  0.4 mg Sublingual Q5 Min PRN Pato Alfred MD       • ondansetron (ZOFRAN) injection 4 mg  4 mg Intravenous Q6H PRN Pato Alfred MD       • pantoprazole (PROTONIX) EC tablet 40 mg  40 mg Oral Q AM Pato Alfred MD   40 mg at 03/27/17 0535   • predniSONE (DELTASONE) tablet 20 mg  20 mg Oral Daily With Breakfast Tiburcio Maguire MD   20 mg at 03/26/17 0925   • sodium chloride 0.9 % flush 1-10 mL  1-10 mL Intravenous PRN Pato Alfred MD       • sodium chloride 0.9 % flush 10 mL  10 mL Intravenous PRN Zackary Dahl Jr., MD           Past Medical History:  Past Medical History:   Diagnosis Date   • CAD (coronary artery disease)    • CAD in native artery    • Cardiac device in situ      in stent restenosis 2/10 RCA   • Carotid atherosclerosis    • Carotid bruit    • Diabetes mellitus     DM, UNCOMPLICATED, TYPE II, UNCONTROLLED   • History of PTCA    • Hyperlipidemia    • Hypertension    • Pacemaker     does not have  a pacemaker    • Peripheral vascular disease        Past Surgical History:  Past Surgical History:   Procedure Laterality Date   • ARTERIOVENOUS FISTULA     • CARDIAC CATHETERIZATION Left 02/18/2010    Stent to  "RCA   • CAROTID STENT      RCA       Family History  Family History   Problem Relation Age of Onset   • Heart attack Father        Social History  Social History     Social History   • Marital status:      Spouse name: N/A   • Number of children: N/A   • Years of education: N/A     Occupational History   • Not on file.     Social History Main Topics   • Smoking status: Former Smoker   • Smokeless tobacco: Not on file      Comment: quit 30 years ago    • Alcohol use 1.2 oz/week     2 Standard drinks or equivalent per week   • Drug use: No   • Sexual activity: Defer     Other Topics Concern   • Not on file     Social History Narrative         Review of Systems:  History obtained from chart review and the patient  General ROS: No fever or chills  Respiratory ROS: No cough or shortness of breath  Cardiovascular ROS: no chest pain,  + dyspnea on exertion  Gastrointestinal ROS: No abdominal pain or melena  Genito-Urinary ROS: No dysuria or hematuria  14 point ROS reviewed with the patient and negative except as noted above and in the HPI unless unable to obtain.    Objective:  /51 (BP Location: Right arm, Patient Position: Lying)  Pulse 66  Temp 98.3 °F (36.8 °C) (Temporal Artery )   Resp 18  Ht 67\" (170.2 cm)  Wt 123 lb 4.8 oz (55.9 kg)  SpO2 91%  BMI 19.31 kg/m2    Intake/Output Summary (Last 24 hours) at 03/27/17 0842  Last data filed at 03/26/17 1300   Gross per 24 hour   Intake              960 ml   Output                0 ml   Net              960 ml     General: awake/alert   Chest:  Clear and equal bilaterally  CVS: regular rate and rhythm  Abdominal: soft, nontender, normal bowel sounds  Extremities: no cyanosis or edema  Skin: warm and dry without rash      Labs:    Results from last 7 days  Lab Units 03/27/17  0446 03/26/17  0711 03/25/17  0526   WBC 10*3/mm3 8.79 9.39 10.81*   HEMOGLOBIN g/dL 10.1* 11.0* 9.7*   HEMATOCRIT % 29.7* 33.1* 30.3*   PLATELETS 10*3/mm3 199 210 216 "           Results from last 7 days  Lab Units 03/27/17  0446 03/26/17  0544 03/25/17  0526  03/22/17  1738   SODIUM mmol/L 129* 130* 134*  < > 141   POTASSIUM mmol/L 4.9 4.7 4.2  < > 3.8   CHLORIDE mmol/L 90* 90* 93*  < > 94*   TOTAL CO2 mmol/L 26.0 29.0 32.0*  < > 37.0*   BUN mg/dL 86* 70* 43*  < > 18   CREATININE mg/dL 4.66* 4.00* 2.66*  < > 1.85*   CALCIUM mg/dL 8.4 8.9 8.6  < > 9.5   BILIRUBIN mg/dL  --   --   --   --  0.6   ALK PHOS U/L  --   --   --   --  101   ALT (SGPT) U/L  --   --   --   --  48   AST (SGOT) U/L  --   --   --   --  51*   GLUCOSE mg/dL 204* 169* 41*  < > 137*   < > = values in this interval not displayed.    Radiology:   Imaging Results (last 72 hours)     Procedure Component Value Units Date/Time    XR Chest 1 View [[id number]] Collected:  03/22/17 1807     Updated:  03/1945    Narrative:       EXAMINATION:  XR CHEST 1 VW- 3/22/2017 5:21 PM CDT     HISTORY: Short of breath.     A single view of the chest is obtained. A right pleural effusion is  present. A small left pleural effusion is present.     A right perihilar interstitial infiltrate is present. This has the  appearance of pulmonary vascular congestion. A right internal jugular  central venous catheter is present, unchanged from 12/03/2016.       Impression:       Vascular congestion with cardiomegaly and bilateral pleural effusions.  This report was finalized on 03/22/2017 19:40 by Dr. Herson Coe MD.    XR Chest PA & Lateral [[id number]] Collected:  03/25/17 0942     Updated:  03/25/17 1612    Narrative:       Frontal supine radiograph of the chest 3/25/2017 7:39 CST     History: dyspnea; J44.1-Chronic obstructive pulmonary disease with  (acute) exacerbation     Comparison: Chest x-ray dated 03/22/2017      Findings:   Lines and tubes are stable in position. No new opacities or  pneumothoraces are visualized in the chest. The cardiomediastinal  silhouette and pulmonary vascularity are unchanged.       No acute osseous or soft  tissue abnormality is noted.        Impression:       Impression:    No significant interval change since previous exam including the  bilateral pleural effusions and associated opacity.        This report was finalized on 03/25/2017 16:09 by Dr. Gabriella Jacob MD.          Culture:  Blood Culture   Date Value Ref Range Status   03/22/2017 No growth at 2 days  Preliminary   03/22/2017 No growth at 2 days  Preliminary         Assessment   1. End stage renal disease on hemodialysis Monday Wednesday Friday  2. Acute exacerbation of chronic obstructive pulmonary disease  3. Chronic congestive heart failure  4. Anemia of chronic disease  5. Hypertension  6. Diabetes mellitus type 2  7. Hyponatremia      Plan:  1.  Maintenance dialysis Monday Wednesday Friday  2.  Continue to follow renal function, electrolytes    Dialysis   Pt was seen on RRT. Tolerating well  Modality: Hemodialysis  Access: Catheter  Location: right upper  QB: 450  QD: 700  UF: 2000    Rob Crowder, APRN  3/27/2017  8:42 AM    Patient was examined, all the data was reviewed..    Assessment:  1.  End-stage renal disease on maintenance dialysis Monday Wednesday Friday and he is status post hemodialysis treatment today  2.  Significant weight loss secondary to loss of appetite.  3.  COPD exacerbation, admitted with COPD this admission.  4.  Hyponatremia, resolving    Plan:  1.  I will start Megace 650 mg twice a day  2.  Patient will be going to nursing home soon

## 2017-03-27 NOTE — PLAN OF CARE
Problem: COPD, Chronic Bronchitis/Emphysema (Adult)  Goal: Signs and Symptoms of Listed Potential Problems Will be Absent or Manageable (COPD, Chronic Bronchitis/Emphysema)  Outcome: Ongoing (interventions implemented as appropriate)    03/26/17 1430   COPD, Chronic Bronchitis/Emphysema   Problems Assessed (COPD, Chronic Bronchitis/Emphysema) all   Problems Present (COPD, Chronic Bronchitis/Emphysema) dyspnea         Problem: Fall Risk (Adult)  Goal: Absence of Falls  Outcome: Ongoing (interventions implemented as appropriate)    03/27/17 0211   Fall Risk (Adult)   Absence of Falls making progress toward outcome

## 2017-03-27 NOTE — PROGRESS NOTES
[facility] Medicine Services  INPATIENT PROGRESS NOTE    Length of Stay: 5  Date of Admission: 3/22/2017  Primary Care Physician: Rafita Harris MD    Subjective   Chief Complaint: follow up weakness    HPI   The patient is currently laying in bed.  He is not as spunky this morning.  He states that he has tired and is a little more short of breath.  It has been 2 days since his last dialysis treatment.  He is to go to dialysis this morning.  Still wanting placement at [facility].  Denies any complaints.  Afebrile.  Continues to be weak but improving.    Review of Systems   Constitutional: Positive for activity change and fatigue. Negative for appetite change and chills.   HENT: Negative for hearing loss, nosebleeds, tinnitus and trouble swallowing.   Eyes: Negative for visual disturbance.   Respiratory: Negative for cough and chest tightness.   Cardiovascular: Negative for palpitations.   Gastrointestinal: Negative for abdominal distention, blood in stool, constipation, diarrhea and nausea.   Endocrine: Negative for cold intolerance, heat intolerance, polydipsia, polyphagia and polyuria.   Genitourinary: Negative for decreased urine volume, difficulty urinating, dysuria, flank pain, frequency and hematuria.   Musculoskeletal: Negative for arthralgias, joint swelling and myalgias.   Allergic/Immunologic: Negative for immunocompromised state.   Neurological: Positive for weakness. Negative for dizziness, syncope and light-headedness.   Hematological: Negative for adenopathy. Does not bruise/bleed easily.   Psychiatric/Behavioral: Negative for confusion and sleep disturbance. The patient is not nervous/anxious.     All pertinent negatives and positives are as above. All other systems have been reviewed and are negative unless otherwise stated.     Objective    Temp:  [97.1 °F (36.2 °C)-98.7 °F (37.1 °C)] 98.3 °F (36.8 °C)  Heart Rate:  [60-74] 66  Resp:  [18-20] 18  BP:  (111-155)/(45-77) 126/51     Physical Exam  Constitutional: He is oriented to person, place, and time. He appears cachectic. He appears ill.   Frail    HENT:   Head: Normocephalic and atraumatic.   Eyes: Conjunctivae and EOM are normal. Pupils are equal, round, and reactive to light.   Neck: Neck supple. No JVD present. No thyromegaly present.   Cardiovascular: Normal rate, regular rhythm, normal heart sounds and intact distal pulses. Exam reveals no gallop and no friction rub.   No murmur heard.  Pulmonary/Chest: Effort normal. No respiratory distress. He has wheezes. He has rales. He exhibits no tenderness.   improved   Abdominal: Soft. Bowel sounds are normal. He exhibits no distension. There is no tenderness. There is no rebound and no guarding.   Musculoskeletal: Normal range of motion. He exhibits no edema, tenderness or deformity.   Lymphadenopathy:   He has no cervical adenopathy.   Neurological: He is alert and oriented to person, place, and time. He displays normal reflexes. No cranial nerve deficit. He exhibits normal muscle tone.   Skin: Skin is warm and dry. No rash noted. There is cyanosis.   Psychiatric: He has a normal mood and affect. His behavior is normal. Judgment and thought content normal.   Vitals reviewed.    Results Review:  I have reviewed the labs, radiology results, and diagnostic studies.    Laboratory Data:     Results from last 7 days  Lab Units 03/27/17  0446 03/26/17  0711 03/25/17  0526   WBC 10*3/mm3 8.79 9.39 10.81*   HEMOGLOBIN g/dL 10.1* 11.0* 9.7*   HEMATOCRIT % 29.7* 33.1* 30.3*   PLATELETS 10*3/mm3 199 210 216       Results from last 7 days  Lab Units 03/27/17  0446 03/26/17  0544 03/25/17  0526  03/22/17  1738   SODIUM mmol/L 129* 130* 134*  < > 141   POTASSIUM mmol/L 4.9 4.7 4.2  < > 3.8   CHLORIDE mmol/L 90* 90* 93*  < > 94*   TOTAL CO2 mmol/L 26.0 29.0 32.0*  < > 37.0*   BUN mg/dL 86* 70* 43*  < > 18   CREATININE mg/dL 4.66* 4.00* 2.66*  < > 1.85*   CALCIUM mg/dL 8.4 8.9  8.6  < > 9.5   BILIRUBIN mg/dL  --   --   --   --  0.6   ALK PHOS U/L  --   --   --   --  101   ALT (SGPT) U/L  --   --   --   --  48   AST (SGOT) U/L  --   --   --   --  51*   GLUCOSE mg/dL 204* 169* 41*  < > 137*   < > = values in this interval not displayed.    Culture Data:   Blood Culture   Date Value Ref Range Status   03/22/2017 No growth at 4 days  Preliminary   03/22/2017 No growth at 4 days  Preliminary     I have reviewed the patient current medications.     Assessment/Plan     Hospital Problem List     Chronic obstructive pulmonary disease with acute exacerbation        Assessment:  1. Acute dyspnea secondary to COPD exacerbation.   2. Hypertension.   3. Dyslipidemia.   4. Diabetes mellitus type 2.   5. Coronary artery disease.   6. Chronic congestive heart failure of uncertain type.   7. Chronic Obstructive Pulmonary disease   8. Severe pulmonary hypertension.   9. End-stage renal disease necessitating hemodialysis.   10. Obstructive sleep apnea.   11. Anemia due to chronic disease likely related to chronic kidney disease.   12. Bilateral pleural effusions   13. Constipation   14. Hyponatremia      Plan:  1. Awaiting placement at [facility]  2. HD today  3. Continue physical therapy  4. Continue bowel regimen and current treatment  5. Labs in AM    Discharge Planning: I expect patient to be discharged to SNF in 1-2 days.    CHARO Zayas   03/27/17   6:52 AM    I personally evaluated and examined the patient in conjunction with CHARO Hernadez and agree with the assessment, treatment plan, and disposition of the patient as recorded by her. My history, exam, and further recommendations are: Patient is currently in dialysis.  I was able to speak with his wife at bedside.  I would like to repeat a chest x-ray following dialysis today.  We will continue to taper his steroids.  We will likely discontinue his antibiotic therapy after his next dose pending the results of a repeat chest x-ray.   Continue Symbicort.  Case management looking into placement at [facility] and rehabilitation.    Tiburcio Maguire MD  03/27/17  10:33 AM

## 2017-03-27 NOTE — PROGRESS NOTES
Continued Stay Note  [facility]     Patient Name: Nicolas Roberts  MRN: [id number]  Today's Date: 3/27/2017    Admit Date: 3/22/2017          Discharge Plan       03/27/17 1154    Case Management/Social Work Plan    Plan [facility]    Patient/Family In Agreement With Plan yes    Additional Comments [facility] has offered pt a bed and his wife accepted. Spoke with her in the room while pt is in dialysis. She said they would like him to go to the [facility] for dialysis. Spoke with Tameka [phone] there and she said she could only put him on 3rd shift and does not want to do that if he will require PATS because PATS will not transport a self pay after 1730. Informed wife and she said they will make sure he has transportation and he will not have to do PATS. Informed Tameka and she said they can start him Wednesday at 3:50 pm if [facility] will get them the information in their system. Faxing [facility] at [phone].               Discharge Codes     None            PUSHPA Child

## 2017-03-28 VITALS
HEIGHT: 67 IN | BODY MASS INDEX: 18.15 KG/M2 | DIASTOLIC BLOOD PRESSURE: 42 MMHG | TEMPERATURE: 97.2 F | SYSTOLIC BLOOD PRESSURE: 117 MMHG | OXYGEN SATURATION: 91 % | WEIGHT: 115.6 LBS | HEART RATE: 66 BPM | RESPIRATION RATE: 18 BRPM

## 2017-03-28 LAB
ANION GAP SERPL CALCULATED.3IONS-SCNC: 10 MMOL/L (ref 4–13)
BASOPHILS # BLD AUTO: 0 10*3/MM3 (ref 0–0.2)
BASOPHILS NFR BLD AUTO: 0 % (ref 0–2)
BUN BLD-MCNC: 42 MG/DL (ref 5–21)
BUN/CREAT SERPL: 13.2 (ref 7–25)
CALCIUM SPEC-SCNC: 8.4 MG/DL (ref 8.4–10.4)
CHLORIDE SERPL-SCNC: 96 MMOL/L (ref 98–110)
CO2 SERPL-SCNC: 30 MMOL/L (ref 24–31)
CREAT BLD-MCNC: 3.17 MG/DL (ref 0.5–1.4)
DEPRECATED RDW RBC AUTO: 46.3 FL (ref 40–54)
EOSINOPHIL # BLD AUTO: 0.29 10*3/MM3 (ref 0–0.7)
EOSINOPHIL NFR BLD AUTO: 3.6 % (ref 0–4)
ERYTHROCYTE [DISTWIDTH] IN BLOOD BY AUTOMATED COUNT: 13.8 % (ref 12–15)
GFR SERPL CREATININE-BSD FRML MDRD: 19 ML/MIN/1.73
GLUCOSE BLD-MCNC: 161 MG/DL (ref 70–100)
GLUCOSE BLDC GLUCOMTR-MCNC: 129 MG/DL (ref 70–130)
HCT VFR BLD AUTO: 34.5 % (ref 40–52)
HGB BLD-MCNC: 11.3 G/DL (ref 14–18)
LYMPHOCYTES # BLD AUTO: 1.07 10*3/MM3 (ref 0.72–4.86)
LYMPHOCYTES NFR BLD AUTO: 13.3 % (ref 15–45)
MCH RBC QN AUTO: 31.4 PG (ref 28–32)
MCHC RBC AUTO-ENTMCNC: 32.8 G/DL (ref 33–36)
MCV RBC AUTO: 95.8 FL (ref 82–95)
MONOCYTES # BLD AUTO: 0.52 10*3/MM3 (ref 0.19–1.3)
MONOCYTES NFR BLD AUTO: 6.5 % (ref 4–12)
NEUTROPHILS # BLD AUTO: 6.18 10*3/MM3 (ref 1.87–8.4)
NEUTROPHILS NFR BLD AUTO: 76.6 % (ref 39–78)
PLATELET # BLD AUTO: 225 10*3/MM3 (ref 130–400)
PMV BLD AUTO: 9.9 FL (ref 6–12)
POTASSIUM BLD-SCNC: 4.9 MMOL/L (ref 3.5–5.3)
RBC # BLD AUTO: 3.6 10*6/MM3 (ref 4.8–5.9)
SODIUM BLD-SCNC: 136 MMOL/L (ref 135–145)
WBC NRBC COR # BLD: 8.06 10*3/MM3 (ref 4.8–10.8)

## 2017-03-28 PROCEDURE — 63710000001 PREDNISONE PER 5 MG: Performed by: INTERNAL MEDICINE

## 2017-03-28 PROCEDURE — 94799 UNLISTED PULMONARY SVC/PX: CPT

## 2017-03-28 PROCEDURE — 94760 N-INVAS EAR/PLS OXIMETRY 1: CPT

## 2017-03-28 PROCEDURE — 80048 BASIC METABOLIC PNL TOTAL CA: CPT | Performed by: NURSE PRACTITIONER

## 2017-03-28 PROCEDURE — 85025 COMPLETE CBC W/AUTO DIFF WBC: CPT | Performed by: INTERNAL MEDICINE

## 2017-03-28 PROCEDURE — 25010000002 HEPARIN (PORCINE) PER 1000 UNITS: Performed by: INTERNAL MEDICINE

## 2017-03-28 PROCEDURE — 82962 GLUCOSE BLOOD TEST: CPT

## 2017-03-28 RX ORDER — METOPROLOL TARTRATE 50 MG/1
50 TABLET, FILM COATED ORAL 2 TIMES DAILY
Qty: 60 TABLET | Refills: 0 | Status: SHIPPED | OUTPATIENT
Start: 2017-03-28

## 2017-03-28 RX ORDER — LEVOFLOXACIN 250 MG/1
250 TABLET ORAL EVERY OTHER DAY
Qty: 4 TABLET | Refills: 0 | Status: SHIPPED | OUTPATIENT
Start: 2017-03-28 | End: 2017-04-04

## 2017-03-28 RX ORDER — IPRATROPIUM BROMIDE AND ALBUTEROL SULFATE 2.5; .5 MG/3ML; MG/3ML
3 SOLUTION RESPIRATORY (INHALATION) EVERY 4 HOURS PRN
Qty: 360 ML | Refills: 1 | Status: SHIPPED | OUTPATIENT
Start: 2017-03-28

## 2017-03-28 RX ORDER — PREDNISONE 10 MG/1
10 TABLET ORAL
Qty: 2 TABLET | Refills: 0 | Status: SHIPPED | OUTPATIENT
Start: 2017-03-28 | End: 2017-03-30

## 2017-03-28 RX ORDER — MEGESTROL ACETATE 40 MG/1
40 TABLET ORAL DAILY
Qty: 30 TABLET | Refills: 0 | Status: SHIPPED | OUTPATIENT
Start: 2017-03-28

## 2017-03-28 RX ORDER — BUDESONIDE AND FORMOTEROL FUMARATE DIHYDRATE 160; 4.5 UG/1; UG/1
2 AEROSOL RESPIRATORY (INHALATION)
Qty: 10.2 G | Refills: 12 | Status: SHIPPED | OUTPATIENT
Start: 2017-03-28

## 2017-03-28 RX ADMIN — METOPROLOL TARTRATE 50 MG: 50 TABLET ORAL at 09:08

## 2017-03-28 RX ADMIN — PANTOPRAZOLE SODIUM 40 MG: 40 TABLET, DELAYED RELEASE ORAL at 06:21

## 2017-03-28 RX ADMIN — HEPARIN SODIUM 5000 UNITS: 5000 INJECTION, SOLUTION INTRAVENOUS; SUBCUTANEOUS at 09:08

## 2017-03-28 RX ADMIN — PREDNISONE 10 MG: 10 TABLET ORAL at 09:08

## 2017-03-28 RX ADMIN — DOCUSATE SODIUM 100 MG: 100 CAPSULE ORAL at 09:08

## 2017-03-28 RX ADMIN — MEGESTROL ACETATE 40 MG: 40 TABLET ORAL at 09:08

## 2017-03-28 RX ADMIN — CLOPIDOGREL BISULFATE 75 MG: 75 TABLET, FILM COATED ORAL at 09:08

## 2017-03-28 RX ADMIN — BUDESONIDE AND FORMOTEROL FUMARATE DIHYDRATE 2 PUFF: 160; 4.5 AEROSOL RESPIRATORY (INHALATION) at 07:24

## 2017-03-28 RX ADMIN — AMLODIPINE BESYLATE 5 MG: 5 TABLET ORAL at 09:08

## 2017-03-28 NOTE — PLAN OF CARE
Problem: Patient Care Overview (Adult)  Goal: Plan of Care Review  Outcome: Ongoing (interventions implemented as appropriate)    03/27/17 1801 03/28/17 0427   Coping/Psychosocial Response Interventions   Plan Of Care Reviewed With patient;spouse --    Patient Care Overview   Progress no change --    Outcome Evaluation   Outcome Summary/Follow up Plan --  no c/o pain. vss. IV fluids continue per orders. pt resting comfortably         Problem: COPD, Chronic Bronchitis/Emphysema (Adult)  Goal: Signs and Symptoms of Listed Potential Problems Will be Absent or Manageable (COPD, Chronic Bronchitis/Emphysema)  Outcome: Ongoing (interventions implemented as appropriate)    Problem: Fall Risk (Adult)  Goal: Absence of Falls  Outcome: Ongoing (interventions implemented as appropriate)    Problem: Diabetes, Type 2 (Adult)  Goal: Signs and Symptoms of Listed Potential Problems Will be Absent or Manageable (Diabetes, Type 2)  Outcome: Ongoing (interventions implemented as appropriate)

## 2017-03-28 NOTE — PROGRESS NOTES
Continued Stay Note  [facility]     Patient Name: Nicolas Roberts  MRN: [id number]  Today's Date: 3/28/2017    Admit Date: 3/22/2017          Discharge Plan       03/28/17 1114    Case Management/Social Work Plan    Plan [facility]    Patient/Family In Agreement With Plan yes    Final Note    Final Note Rec'd a schedule from Felicity from [facility] and confirmed it with Tameka at [facility] [phone]. She made one change from what [facility] gave but it is the same as she gave yesterday. Pt will start tomorrow at 1520 and will need to be there after that every MWF at 1550. Gave this schedule to the spouse as she will be transporting to the appointments. [facility] [phone] is ready for pt today. He will be skilled. Spouse will transport.               Discharge Codes       03/28/17 1118    Discharge Codes    Discharge Codes 03  Discharged/transferred to skilled nursing facility (SNF) with Medicare certification in anticipation of skilled care        Expected Discharge Date and Time     Expected Discharge Date Expected Discharge Time    Mar 28, 2017             PUSHPA Child

## 2017-03-28 NOTE — PLAN OF CARE
Problem: Inpatient Physical Therapy  Goal: Bed Mobility Goal LTG- PT  Outcome: Unable to achieve outcome(s) by discharge Date Met:  03/28/17 03/23/17 1602 03/28/17 1159   Bed Mobility PT LTG   Bed Mobility PT LTG, Date Established 03/23/17 --    Bed Mobility PT LTG, Time to Achieve by discharge --    Bed Mobility PT LTG, Activity Type all bed mobility --    Bed Mobility PT LTG, [place] Level independent --    Bed Mobility PT LTG, Date Goal Reviewed --  03/28/17   Bed Mobility PT LTG, Outcome --  goal not met   Bed Mobility PT LTG, Reason Goal Not Met --  discharged from facility       Goal: Transfer Training Goal 1 LTG- PT  Outcome: Unable to achieve outcome(s) by discharge Date Met:  03/28/17 03/23/17 1602 03/28/17 1159   Transfer Training PT LTG   Transfer Training PT LTG, Date Established 03/23/17 --    Transfer Training PT LTG, Time to Achieve by discharge --    Transfer Training PT LTG, Activity Type bed to chair /chair to bed;sit to stand/stand to sit --    Transfer Training PT LTG, [place] Level conditional independence --    Transfer Training PT LTG, Assist Device walker, rolling --    Transfer Training PT LTG, Date Goal Reviewed --  03/28/17   Transfer Training PT LTG, Outcome --  goal not met   Transfer Training PT LTG, Reason Goal Not Met --  discharged from facility       Goal: Gait Training Goal LTG- PT  Outcome: Unable to achieve outcome(s) by discharge Date Met:  03/28/17 03/23/17 1602 03/28/17 1159   Gait Training PT LTG   Gait Training Goal PT LTG, Date Established 03/23/17 --    Gait Training Goal PT LTG, Time to Achieve by discharge --    Gait Training Goal PT LTG, [place] Level conditional independence --    Gait Training Goal PT LTG, Assist Device walker, rolling --    Gait Training Goal PT LTG, Distance to Achieve 350 --    Gait Training Goal PT LTG, Date Goal Reviewed --  03/28/17   Gait Training Goal PT LTG, Outcome --  goal not met   Gait Training Goal PT LTG, Reason  Goal Not Met --  discharged from facility

## 2017-03-28 NOTE — THERAPY DISCHARGE NOTE
Acute Care - Physical Therapy Discharge Summary  [facility]       Patient Name: Nicolas Roberts  : 1945  MRN: [id number]    Today's Date: 3/28/2017  Onset of Illness/Injury or Date of Surgery Date: 17    Date of Referral to PT: 17  Referring Physician: Bernardo MANCILLA      Admit Date: 3/22/2017      PT Recommendation and Plan    Visit Dx:    ICD-10-CM ICD-9-CM   1. Chronic obstructive pulmonary disease with acute exacerbation J44.1 491.21   2. Impaired mobility Z74.09 799.89             Outcome Measures       17 1534 17 1502       How much help from another person do you currently need...    Turning from your back to your side while in flat bed without using bedrails? 3  - 3  -TONE     Moving from lying on back to sitting on the side of a flat bed without bedrails? 3  - 3  -TONE     Moving to and from a bed to a chair (including a wheelchair)? 3  - 3  -TONE     Standing up from a chair using your arms (e.g., wheelchair, bedside chair)? 3  - 3  -TONE     Climbing 3-5 steps with a railing? 2  - 3  -TONE     To walk in hospital room? 3  - 3  -TONE     AM-PAC 6 Clicks Score 17  - 18  -TONE     Functional Assessment    Outcome Measure Options AM-PAC 6 Clicks Basic Mobility (PT)  - AM-PAC 6 Clicks Basic Mobility (PT)  -       User Key  (r) = Recorded By, (t) = Taken By, (c) = Cosigned By    Initials Name Provider Type    TONE Aguero PTA Physical Therapy Assistant     Meron Baxter [facility] Physical Therapy Assistant                      IP PT Goals       17 1159 17 1602       Bed Mobility PT LTG    Bed Mobility PT LTG, Date Established  17  -PB     Bed Mobility PT LTG, Time to Achieve  by discharge  -PB     Bed Mobility PT LTG, Activity Type  all bed mobility  -PB     Bed Mobility PT LTG, [place] Level  independent  -PB     Bed Mobility PT LTG, Date Goal Reviewed 17  -      Bed Mobility PT LTG, Outcome goal not met  -      Bed Mobility PT  LTG, Reason Goal Not Met discharged from facility  -MF      Transfer Training PT LTG    Transfer Training PT LTG, Date Established  03/23/17  -PB     Transfer Training PT LTG, Time to Achieve  by discharge  -PB     Transfer Training PT LTG, Activity Type  bed to chair /chair to bed;sit to stand/stand to sit  -PB     Transfer Training PT LTG, [place] Level  conditional independence  -PB     Transfer Training PT LTG, Assist Device  walker, rolling  -PB     Transfer Training PT  LTG, Date Goal Reviewed 03/28/17  -      Transfer Training PT LTG, Outcome goal not met  -MF      Transfer Training PT LTG, Reason Goal Not Met discharged from facility  -MF      Gait Training PT LTG    Gait Training Goal PT LTG, Date Established  03/23/17  -PB     Gait Training Goal PT LTG, Time to Achieve  by discharge  -PB     Gait Training Goal PT LTG, [place] Level  conditional independence  -PB     Gait Training Goal PT LTG, Assist Device  walker, rolling  -PB     Gait Training Goal PT LTG, Distance to Achieve  350  -PB     Gait Training Goal PT LTG, Date Goal Reviewed 03/28/17  -      Gait Training Goal PT LTG, Outcome goal not met  -MF      Gait Training Goal PT LTG, Reason Goal Not Met discharged from facility  -        User Key  (r) = Recorded By, (t) = Taken By, (c) = Cosigned By    Initials Name Provider Type     Meron Baxter PTA Physical Therapy Assistant    YUNG Bedolla, PT DPT Physical Therapist          Therapy Charges for Today     Code Description Service Date Service Provider Modifiers Qty    75715168448 HC GAIT TRAINING EA 15 MIN 3/27/2017 Meron Baxter PTA GP 1    65343061015 HC PT THER PROC EA 15 MIN 3/27/2017 Meron Baxter PTA GP 1    15343170839 HC PT THERAPEUTIC ACT EA 15 MIN 3/27/2017 Meron Baxter, MORENA GP 1          PT Discharge Summary  Anticipated Discharge Disposition: skilled nursing facility  Reason for Discharge: Discharge from facility  Outcomes Achieved: Unable  to make functional progress toward goals at this time  Discharge Destination: [facility]      Meron Baxter, PTA   3/28/2017

## 2017-03-28 NOTE — DISCHARGE SUMMARY
[facility] Medicine Services  DISCHARGE SUMMARY       Date of Admission: 3/22/2017  Date of Discharge:  3/28/2017  Primary Care Physician: Rafita Harris MD    Presenting Problem/History of Present Illness:  Chronic obstructive pulmonary disease with acute exacerbation [J44.1]     Final Discharge Diagnoses:  Hospital Problem List     Chronic obstructive pulmonary disease with acute exacerbation      1. Acute dyspnea secondary to COPD exacerbation.   2. Hypertension.   3. Dyslipidemia.   4. Diabetes mellitus type 2.   5. Coronary artery disease.   6. Chronic congestive heart failure of uncertain type.   7. Chronic Obstructive Pulmonary disease   8. Severe pulmonary hypertension.   9. End-stage renal disease necessitating hemodialysis.   10. Obstructive sleep apnea.   11. Anemia due to chronic disease likely related to chronic kidney disease.   12. Bilateral pleural effusions   13. Constipation   14. Hyponatremia    Consults:   1.  Nephrology, Dr. Phipps    Procedures Performed: None    Pertinent Test Results:   Lab Results (last 24 hours)     Procedure Component Value Units Date/Time    POC Glucose Fingerstick [[id number]]  (Abnormal) Collected:  03/27/17 1155    Specimen:  Blood Updated:  03/27/17 1225     Glucose 134 (H) mg/dL       : [id number] Lakhwinder Souza ID: [id number]       POC Glucose Fingerstick [[id number]]  (Abnormal) Collected:  03/27/17 1605    Specimen:  Blood Updated:  03/27/17 1625     Glucose 186 (H) mg/dL       : [id number] Beba AlmanzaMeter ID: [id number]       POC Glucose Fingerstick [[id number]]  (Abnormal) Collected:  03/27/17 1714    Specimen:  Blood Updated:  03/27/17 1730     Glucose 164 (H) mg/dL       : [id number] Freda Rosales ID: [id number]       Blood Culture [[id number]]  (Normal) Collected:  03/22/17 1738    Specimen:  Blood from Arm, Right Updated:  03/27/17 1801     Blood Culture No growth at 5 days    Blood Culture  [[id number]]  (Normal) Collected:  03/22/17 1738    Specimen:  Blood from Arm, Right Updated:  03/27/17 1801     Blood Culture No growth at 5 days    POC Glucose Fingerstick [[id number]]  (Abnormal) Collected:  03/27/17 2140    Specimen:  Blood Updated:  03/27/17 2151     Glucose 227 (H) mg/dL       : [id number] Bae JosephMeter ID: [id number]       CBC & Differential [[id number]] Collected:  03/28/17 0453    Specimen:  Blood Updated:  03/28/17 0625    Narrative:       The following orders were created for panel order CBC & Differential.  Procedure                               Abnormality         Status                     ---------                               -----------         ------                     CBC Auto Differential[[id number]]         Abnormal            Final result                 Please view results for these tests on the individual orders.    CBC Auto Differential [[id number]]  (Abnormal) Collected:  03/28/17 0453    Specimen:  Blood Updated:  03/28/17 0625     WBC 8.06 10*3/mm3      RBC 3.60 (L) 10*6/mm3      Hemoglobin 11.3 (L) g/dL      Hematocrit 34.5 (L) %      MCV 95.8 (H) fL      MCH 31.4 pg      MCHC 32.8 (L) g/dL      RDW 13.8 %      RDW-SD 46.3 fl      MPV 9.9 fL      Platelets 225 10*3/mm3      Neutrophil % 76.6 %      Lymphocyte % 13.3 (L) %      Monocyte % 6.5 %      Eosinophil % 3.6 %      Basophil % 0.0 %      Neutrophils, Absolute 6.18 10*3/mm3      Lymphocytes, Absolute 1.07 10*3/mm3      Monocytes, Absolute 0.52 10*3/mm3      Eosinophils, Absolute 0.29 10*3/mm3      Basophils, Absolute 0.00 10*3/mm3     Basic Metabolic Panel [[id number]]  (Abnormal) Collected:  03/28/17 0453    Specimen:  Blood Updated:  03/28/17 0640     Glucose 161 (H) mg/dL      BUN 42 (H) mg/dL      Creatinine 3.17 (H) mg/dL      Sodium 136 mmol/L      Potassium 4.9 mmol/L      Chloride 96 (L) mmol/L      CO2 30.0 mmol/L      Calcium 8.4 mg/dL      eGFR Non African Amer 19 (L) mL/min/1.73      BUN/Creatinine Ratio  13.2     Anion Gap 10.0 mmol/L     Narrative:       The MDRD GFR formula is only valid for adults with stable renal function between ages 18 and 70.        Imaging Results (last 24 hours)     Procedure Component Value Units Date/Time    XR Chest 1 View [[id number]] Collected:  03/27/17 1441     Updated:  03/27/17 1707    Narrative:       FRONTAL SUPINE RADIOGRAPH OF THE CHEST 3/27/2017 2:04 PM CDT     History: follow-up infiltrates/effusions; J44.1-Chronic obstructive  pulmonary disease with (acute) exacerbation; Z74.09-Other reduced  mobility     Comparison: Chest x-ray dated 03/25/2017.      Findings:   Lines and tubes are stable in position. Interstitial edema, decreasing  with residual central pulmonary venous congestion. Bilateral pleural  effusions are unchanged. The cardiomediastinal silhouette and pulmonary  vascularity are unchanged.       No acute osseous or soft tissue abnormality is noted.        Impression:       Impression:   1.  Decreasing interstitial edema with residual central pulmonary  vascular congestion.  2.  Similar bilateral pleural effusions, small to moderate.        This report was finalized on 03/27/2017 17:04 by Dr. Gabriella Jacob MD.        Chief Complaint on Day of Discharge: None    Hospital Course:  The patient is a 72 y.o. male who presented to [facility] with shortness of breath, fatigue and generalized weakness.  The symptoms have been present for approximately one week prior to seeking evaluation at our emergency department.  He is a end-stage renal disease patient who has hemodialysis on Monday, Wednesdays and Fridays.  On the day of admission he had just received hemodialysis and was feeling much weaker than normal.  Therefore his wife brought him to the emergency department.  Upon initial evaluation he was having acute respiratory insufficiency likely associated with congestive heart failure.  His initial chest x-ray demonstrated vascular congestion with  "cardiomegaly and bilateral pleural effusions.  He was admitted to the hospital for further workup and treatment.  Nephrology was consulted to manage his hemodialysis.  He was also felt to have an acute COPD exacerbation as well.  He was placed on IV Levaquin every other day, IV steroids and DuoNeb nebs.  Physical therapy was consulted.  The patient walked 15 foot yesterday with physical therapy but was very weak.  During his hospitalization he has improved.  He is back on his chronic 2 L/m oxygen therapy.  He states his shortness of breath has much improved.  He has continued to have dialysis on Monday, Wednesdays and Fridays while admitted.  He did have slight hypotension yesterday after dialysis but improved after 250 mL normal saline bolus.  Since then his blood pressures have been more than adequate.  His steroids have been tapered down, now on 10 mg orally daily.  He will continue his taper at the nursing home.  He will also continue his antibiotic therapy at the nursing home.  Today he is alert and oriented in no respiratory distress with normal vital signs.  He is stable for discharge.  He is to follow-up with Dr. Harris once discharged from [facility] nursing and rehabilitation.    Condition on Discharge:  Stable    Physical Exam on Discharge:  /42 (BP Location: Right arm, Patient Position: Sitting)  Pulse 66  Temp 97.2 °F (36.2 °C) (Temporal Artery )   Resp 18  Ht 67\" (170.2 cm)  Wt 115 lb 9.6 oz (52.4 kg)  SpO2 91%  BMI 18.11 kg/m2     Physical Exam   Constitutional: He is oriented to person, place, and time. He appears cachectic. He has a sickly appearance.   HENT:   Head: Normocephalic and atraumatic.   Eyes: Conjunctivae and EOM are normal. Pupils are equal, round, and reactive to light.   Neck: Neck supple. No JVD present. No thyromegaly present.   Cardiovascular: Normal rate, regular rhythm, normal heart sounds and intact distal pulses.  Exam reveals no gallop and no friction rub.    No " murmur heard.  Pulmonary/Chest: Effort normal and breath sounds normal. No respiratory distress. He has no wheezes. He has no rales. He exhibits no tenderness.   Abdominal: Soft. Bowel sounds are normal. He exhibits no distension. There is no tenderness. There is no rebound and no guarding.   Musculoskeletal: Normal range of motion. He exhibits no edema, tenderness or deformity.   Lymphadenopathy:     He has no cervical adenopathy.   Neurological: He is alert and oriented to person, place, and time. He displays normal reflexes. No cranial nerve deficit. He exhibits normal muscle tone.   Skin: Skin is warm and dry. No rash noted.   Psychiatric: He has a normal mood and affect. His behavior is normal. Judgment and thought content normal.     Discharge Disposition:  Skilled Nursing Facility ([place] - External)    Discharge Medications:   Armando Nicolas   Home Medication Instructions LUIS ANGEL:[id number]    Printed on:03/28/17 8651   Medication Information                      allopurinol (ZYLOPRIM) 100 MG tablet  Take 100 mg by mouth 2 (Two) Times a Day.             amLODIPine (NORVASC) 5 MG tablet  Take 5 mg by mouth 2 (Two) Times a Day.             atorvastatin (LIPITOR) 80 MG tablet  Take 80 mg by mouth Every Night.             B Complex-C-Folic Acid (JABIER-ARIS PO)  Take 1 tablet by mouth Daily.             budesonide-formoterol (SYMBICORT) 160-4.5 MCG/ACT inhaler  Inhale 2 puffs 2 (Two) Times a Day.             clopidogrel (PLAVIX) 75 MG tablet  Take 75 mg by mouth Daily.             Ferric Citrate (AURYXIA) 1  MG(FE) tablet  Take 1 tablet by mouth Every Night.             Insulin Glargine (LANTUS SOLOSTAR) 100 UNIT/ML injection pen  Inject 10 Units under the skin Every Night.             ipratropium-albuterol (DUO-NEB) 0.5-2.5 mg/mL nebulizer  Take 3 mL by nebulization Every 4 (Four) Hours As Needed for Wheezing or Shortness of Air.             levoFLOXacin (LEVAQUIN) 250 MG tablet  Take 1 tablet by mouth Every  Other Day for 4 doses. Indications: Upper Respiratory Tract Infection             megestrol (MEGACE) 40 MG tablet  Take 1 tablet by mouth Daily.             metoprolol tartrate (LOPRESSOR) 50 MG tablet  Take 1 tablet by mouth 2 (Two) Times a Day.             nitroglycerin (NITROSTAT) 0.4 MG SL tablet  Place 0.4 mg under the tongue Every 5 (Five) Minutes As Needed for chest pain. Take no more than 3 doses in 15 minutes.             omeprazole (PriLOSEC) 20 MG capsule  Take 20 mg by mouth 2 (Two) Times a Day.             predniSONE (DELTASONE) 10 MG tablet  Take 1 tablet by mouth Daily With Breakfast for 2 days.               Discharge Diet:   Diet Instructions     Diet: Regular, Consistent Carbohydrate, Cardiac, Renal; Thin Liquids, No Restrictions       Discharge Diet:   Regular  Consistent Carbohydrate  Cardiac  Renal      Fluid Consistency:  Thin Liquids, No Restrictions               Activity at Discharge:   Activity Instructions     Activity as Tolerated                   Follow-up Appointments:   1. Follow up with Dr. Harris once d/c from [facility].   Future Appointments  Date Time Provider Department Center   5/2/2017 1:30 PM [facility] NP [facility] CD [facility] None     Test Results Pending at Discharge: None    CHARO Zayas  03/28/17  7:53 AM    Time: 35 minutes     I personally evaluated and examined the patient in conjunction with CHARO Hernadez and agree with the assessment, treatment plan, and disposition of the patient as recorded by her. My history, exam, and further recommendations are: Patient feels much better, and from a respiratory standpoint feels like he is back to his baseline.  I did review his chest x-ray with him and his spouse at the bedside which reveals improvement in his interstitial edema, and stable pleural effusions.  He will continue his outpatient hemodialysis regimen as outlined by nephrology.  He is agreeable to additional rehabilitation, and for that reason plan on discharge  to [facility] and rehabilitation today.    Tiburcio Maguire MD  03/28/17  8:39 AM

## 2017-04-12 LAB
BLOOD CULTURE, ROUTINE: NORMAL
CULTURE, BLOOD 2: NORMAL

## 2022-05-09 NOTE — PLAN OF CARE
Problem: Patient Care Overview (Adult)  Goal: Plan of Care Review  Outcome: Ongoing (interventions implemented as appropriate)  No complaints today.  Patient states he is feeling better    03/26/17 1430   Coping/Psychosocial Response Interventions   Plan Of Care Reviewed With patient   Patient Care Overview   Progress improving         Problem: COPD, Chronic Bronchitis/Emphysema (Adult)  Goal: Signs and Symptoms of Listed Potential Problems Will be Absent or Manageable (COPD, Chronic Bronchitis/Emphysema)  Outcome: Ongoing (interventions implemented as appropriate)    Problem: Fall Risk (Adult)  Goal: Absence of Falls  Outcome: Ongoing (interventions implemented as appropriate)    Problem: Diabetes, Type 2 (Adult)  Goal: Signs and Symptoms of Listed Potential Problems Will be Absent or Manageable (Diabetes, Type 2)  Outcome: Ongoing (interventions implemented as appropriate)       Never smoker

## 2024-09-01 NOTE — PLAN OF CARE
Problem: Patient Care Overview (Adult)  Goal: Plan of Care Review  Outcome: Ongoing (interventions implemented as appropriate)    03/27/17 0473   Coping/Psychosocial Response Interventions   Plan Of Care Reviewed With patient   Patient Care Overview   Progress unable to show any progress toward functional goals   Outcome Evaluation   Outcome Summary/Follow up Plan Pt. c/o fatigue following dialysis. Pt. was CGA for supine to sit. Pt. sat and exercised for 12 minutes prior to getting up Pt. walked 15' and then began to c/o dizziness. Pt. was turned and walked back to chair. BP was checked and was found to be 86/36. Nursing was notified and assisted pt. back to bed. Will continue to work with pt. as tolerated on mobility and strength.            show